# Patient Record
Sex: FEMALE | Race: WHITE
[De-identification: names, ages, dates, MRNs, and addresses within clinical notes are randomized per-mention and may not be internally consistent; named-entity substitution may affect disease eponyms.]

---

## 2017-04-09 ENCOUNTER — HOSPITAL ENCOUNTER (INPATIENT)
Dept: HOSPITAL 23 - P1E | Age: 67
LOS: 8 days | Discharge: HOME | DRG: 885 | End: 2017-04-17
Admitting: PSYCHIATRY & NEUROLOGY
Payer: COMMERCIAL

## 2017-04-09 DIAGNOSIS — Z88.0: ICD-10-CM

## 2017-04-09 DIAGNOSIS — Z85.820: ICD-10-CM

## 2017-04-09 DIAGNOSIS — E78.5: ICD-10-CM

## 2017-04-09 DIAGNOSIS — F39: ICD-10-CM

## 2017-04-09 DIAGNOSIS — I48.91: ICD-10-CM

## 2017-04-09 DIAGNOSIS — Z88.1: ICD-10-CM

## 2017-04-09 DIAGNOSIS — I10: ICD-10-CM

## 2017-04-09 DIAGNOSIS — E66.01: ICD-10-CM

## 2017-04-09 DIAGNOSIS — E11.9: ICD-10-CM

## 2017-04-09 DIAGNOSIS — Z91.040: ICD-10-CM

## 2017-04-09 DIAGNOSIS — F33.2: Primary | ICD-10-CM

## 2017-04-10 LAB
BARBITURATES UR QL SCN: 0.5 MG/DL (ref 0.2–2)
BARBITURATES UR QL SCN: 3.6 G/DL (ref 3.5–5)
BARBITURATES: (no result)
BASOPHIL#: 0 X10E3 (ref 0–0.3)
BASOPHIL%: 0.8 % (ref 0–2.5)
BENZODIAZ UR QL SCN: 14 U/L (ref 10–40)
BENZODIAZ UR QL SCN: 15 U/L (ref 10–42)
BENZODIAZEPINES: (no result)
BLOOD UREA NITROGEN: 22 MG/DL (ref 9–23)
BUN/CREATININE RATIO: 24.44
BZE UR QL SCN: 76 U/L (ref 32–92)
CALCIUM SERUM: 8.8 MG/DL (ref 8.4–10.2)
CK MB SERPL-RTO: 13.8 % (ref 11–15.5)
CK MB SERPL-RTO: 33.1 G/DL (ref 30–36)
COCAINE: (no result)
CREATININE SERUM: 0.9 MG/DL (ref 0.6–1.4)
DIFF IND: NO
DX ICD CODE: (no result)
DX ICD CODE: (no result)
EOSINOPHIL#: 0 X10E3 (ref 0–0.7)
EOSINOPHIL%: 0.7 % (ref 0–7)
FREE THYROXIN (T4): 0.94 NG/DL (ref 0.58–1.64)
GLOM FILT RATE ESTIMATED: 67.1 ML/MIN (ref 60–?)
GLUCOSE FASTING: 99 MG/DL (ref 70–110)
HEMATOCRIT: 37.1 % (ref 35–45)
HEMOGLOBIN: 12.3 GM/DL (ref 12–16)
KETONES UR QL: 104 MMOL/L (ref 100–111)
KETONES UR QL: 27 MMOL/L (ref 22–31)
LYMPHOCYTE#: 1.7 X10E3 (ref 1–3.5)
LYMPHOCYTE%: 26.6 % (ref 17–45)
MEAN CELL VOLUME: 92.4 FL (ref 83–96)
MEAN CORPUSCULAR HEMOGLOBIN: 30.6 PG (ref 28–34)
MEAN PLATELET VOLUME: 9 FL (ref 6.5–11.5)
MONOCYTE#: 0.6 X10E3 (ref 0–1)
MONOCYTE%: 10.1 % (ref 3–12)
NEUTROPHIL#: 3.9 X10E3 (ref 1.5–7.1)
NEUTROPHIL%: 61.8 % (ref 40–75)
OPIATES: (no result)
PLATELET COUNT: 193 X10E3 (ref 140–420)
POTASSIUM: 4.1 MMOL/L (ref 3.5–5.1)
PROTEIN TOTAL SERUM: 6.1 G/DL (ref 6–8.3)
RED BLOOD COUNT: 4.02 X10E (ref 3.9–5.3)
SODIUM: 139 MMOL/L (ref 135–145)
TEGRETOL (CARBAMAZEPINE): 4.6 UG/ML (ref 4–12)
THYROID STIMULATING HORMONE: 3.13 UIU/ML (ref 0.34–5.6)
TRICYCLIC ANTIDEPRESSANTS: (no result)
U METHADONE: (no result)
URINE AMORPHOUS SEDIMENT: (no result)
URINE APPEARANCE: (no result)
URINE BILIRUBIN: (no result)
URINE BLOOD: (no result)
URINE COLOR: YELLOW
URINE CRYSTALS: (no result) /[HPF]
URINE GLUCOSE: (no result) MG/DL
URINE KETONE: (no result)
URINE LEUKOCYTE ESTERASE: (no result)
URINE NITRATE: (no result)
URINE PH: 5 (ref 5–8)
URINE PROTEIN: (no result)
URINE SOURCE: (no result)
URINE SPECIFIC GRAVITY: 1.02 (ref 1–1.03)
URINE SQUAMOUS EPITHELIAL CELL: (no result) /[HPF]
URINE UROBILINOGEN: (no result) MG/DL
WHITE BLOOD COUNT: 6.3 X10E3 (ref 4–10.5)

## 2019-05-09 ENCOUNTER — OFFICE VISIT (OUTPATIENT)
Dept: GYNECOLOGIC ONCOLOGY | Facility: CLINIC | Age: 69
End: 2019-05-09

## 2019-05-09 ENCOUNTER — PREP FOR SURGERY (OUTPATIENT)
Dept: GYNECOLOGIC ONCOLOGY | Facility: CLINIC | Age: 69
End: 2019-05-09

## 2019-05-09 VITALS
HEART RATE: 63 BPM | WEIGHT: 240.8 LBS | TEMPERATURE: 98.2 F | HEIGHT: 65 IN | RESPIRATION RATE: 16 BRPM | SYSTOLIC BLOOD PRESSURE: 133 MMHG | DIASTOLIC BLOOD PRESSURE: 65 MMHG | OXYGEN SATURATION: 96 % | BODY MASS INDEX: 40.12 KG/M2

## 2019-05-09 DIAGNOSIS — C54.1 ENDOMETRIAL CANCER (HCC): Primary | ICD-10-CM

## 2019-05-09 PROCEDURE — 99205 OFFICE O/P NEW HI 60 MIN: CPT | Performed by: OBSTETRICS & GYNECOLOGY

## 2019-05-09 RX ORDER — CITALOPRAM 20 MG/1
20 TABLET ORAL NIGHTLY
COMMUNITY

## 2019-05-09 RX ORDER — ASPIRIN 81 MG/1
81 TABLET ORAL DAILY
COMMUNITY

## 2019-05-09 RX ORDER — SENNOSIDES 8.6 MG
650 CAPSULE ORAL 2 TIMES DAILY PRN
Status: ON HOLD | COMMUNITY
End: 2019-05-17

## 2019-05-09 RX ORDER — CARBAMAZEPINE 200 MG/1
400 TABLET, EXTENDED RELEASE ORAL NIGHTLY
COMMUNITY

## 2019-05-09 RX ORDER — ARIPIPRAZOLE 5 MG/1
5 TABLET ORAL NIGHTLY
COMMUNITY

## 2019-05-09 RX ORDER — CETIRIZINE HYDROCHLORIDE 10 MG/1
10 TABLET ORAL NIGHTLY
COMMUNITY
End: 2020-03-05

## 2019-05-09 RX ORDER — MONTELUKAST SODIUM 10 MG/1
10 TABLET ORAL NIGHTLY
COMMUNITY

## 2019-05-09 RX ORDER — LISINOPRIL 5 MG/1
5 TABLET ORAL DAILY
COMMUNITY

## 2019-05-09 RX ORDER — ATORVASTATIN CALCIUM 10 MG/1
20 TABLET, FILM COATED ORAL DAILY
COMMUNITY

## 2019-05-09 NOTE — PROGRESS NOTES
Bhavna Talbot  [id number]  1950      Reason for visit: Grade 1 endometrial adenocarcinoma, obesity, bipolar disorder, history of auditory hallucinations with stressors    Consultation:  Patient is being seen at the request of Dr. Gross     History of present illness:  The patient is a 68 y.o. female who presents today for treatment and evaluation of the above issues.    Patient initially presented with one episode of postmenopausal bleeding about 1 month ago.  Per records, an ultrasound was performed that showed a thickened endometrial stripe.  Images are not available for review. An endometrial biopsy was attempted in the office but was not successful.  Hysteroscopy with D&C was performed on 4/19/2019.  Pathology returned as endometrioid type well differentiated adenocarcinoma.  She denies any bowel complaints.  She endorses urinary urgency.  She is feeling very fatigued.  She is referred here for further evaluation.    She reports a history of bipolar disorder. She is managed by SHAHRZAD Hawthorne at [facility].  Her next appointment is on May 16th.  She is taking carbamazepine and aripiprazole.  She reports that she has had exacerbations twice in her life, associated with stressful events.  The last was about 2 years ago when her  was sick.  She reports auditory hallucinations at that time and thinks that it was related to an episode of diverticulitis she experienced due to poor eating habits.  She reports that her  passed away in December 2018.  She is accompanied today by a close friend.    She reports that she has been working on weight loss by eating with a nutritionist and increasing her walking.    For new patients, Counts include 234 beds at the [facility] intake form from 5/9/19 was reviewed and confirmed today.    OBGYN History:  She is a G0.  She does not use HRT. She does not have a history of abnormal pap smears.    Oncologic History:   No history exists.         Past Medical History:   Diagnosis Date    • Asthma    • Atrial fibrillation and flutter (CMS/[facility])    • Bipolar 1 disorder (CMS/[facility]) 1988   • Diabetes type 2, controlled (CMS/[facility])    • Diverticulitis 2016   • Hearing loss 1970   • High blood pressure    • Sleep apnea 1992       Past Surgical History:   Procedure Laterality Date   • INNER EAR SURGERY  1970   • REPLACEMENT TOTAL KNEE  2018   • SINUS SURGERY  1970   • UVULOPLASTY  2000       MEDICATIONS: The current medication list was reviewed with the patient and updated in the EMR this date per the Medical Assistant. Medication dosages and frequencies were confirmed to be accurate.      Allergies:  is allergic to eggs or egg-derived products; keflex [cephalexin]; latex; and penicillins.    Social History:   Social History     Socioeconomic History   • Marital status:      Spouse name: Not on file   • Number of children: Not on file   • Years of education: Not on file   • Highest education level: Not on file   Tobacco Use   • Smoking status: Never Smoker   Substance and Sexual Activity   • Alcohol use: Yes     Frequency: Monthly or less     Drinks per session: 1 or 2     Binge frequency: Never   • Drug use: No   • Sexual activity: No     Partners: Male       Family History:    Family History   Problem Relation Age of Onset   • Alzheimer's disease Mother    • Esophageal cancer Father    • Diabetes Maternal Grandmother    • Colon cancer Paternal Grandfather    • Diabetes Paternal Uncle    • Heart attack Brother        Health Maintenance:    Health Maintenance   Topic Date Due   • ANNUAL PHYSICAL  05/12/1953   • TDAP/TD VACCINES (1 - Tdap) 05/12/1970   • ZOSTER VACCINE (1 of 2) 05/12/2001   • PNEUMOCOCCAL VACCINES (65+ LOW/MEDIUM RISK) (1 of 2 - PCV13) 05/12/2016   • HEPATITIS C SCREENING  05/09/2019   • COLONOSCOPY  05/09/2019   • INFLUENZA VACCINE  08/01/2019       Review of Systems   Constitutional: Positive for chills and fatigue. Negative for activity change, appetite change and diaphoresis.  "  HENT: Positive for rhinorrhea, sinus pain and tinnitus. Negative for ear pain and sore throat.    Eyes: Positive for visual disturbance (corrective lenses). Negative for photophobia.   Respiratory: Negative for chest tightness, shortness of breath and wheezing.    Cardiovascular: Negative for chest pain and palpitations.   Gastrointestinal: Negative for abdominal distention, abdominal pain, constipation, diarrhea, nausea and vomiting.   Endocrine: Negative for cold intolerance and heat intolerance.   Genitourinary: Positive for frequency and urgency. Negative for dysuria, hematuria and pelvic pain.   Musculoskeletal: Positive for myalgias. Negative for joint swelling.   Skin: Negative for color change and rash.   Allergic/Immunologic: Negative for environmental allergies and immunocompromised state.   Neurological: Negative for dizziness, numbness and headaches.   Hematological: Negative for adenopathy. Does not bruise/bleed easily.   Psychiatric/Behavioral: Positive for dysphoric mood (depression). Negative for agitation and hallucinations.       Physical Exam    Vitals:    05/09/19 1501   BP: 133/65   Pulse: 63   Resp: 16   Temp: 98.2 °F (36.8 °C)   TempSrc: Oral   SpO2: 96%   Weight: 109 kg (240 lb 12.8 oz)   Height: 163.8 cm (64.5\")   PainSc:   5     Body mass index is 40.69 kg/m².    Wt Readings from Last 3 Encounters:   05/09/19 109 kg (240 lb 12.8 oz)       GENERAL: Alert, well-appearing female appearing her stated age who is in no apparent distress.  Patient is obese by BMI criteria.  HEENT: Sclera anicteric. Head normocephalic, atraumatic. Mucus membranes moist.   NECK: Trachea midline, supple, without masses.  No thyromegaly.   BREASTS: Deferred  CARDIOVASCULAR: Normal rate, regular rhythm, no murmurs, rubs, or gallops.  2+ peripheral edema bilaterally in lower extremities.  RESPIRATORY: Clear to auscultation bilaterally, normal respiratory effort  BACK:  No CVA tenderness, no vertebral tenderness on " palpation  GASTROINTESTINAL:  Abdomen is soft, non-tender, non-distended, no rebound or guarding, no masses, or hernias. No HSM.  Abdomen is obese  SKIN:  Warm, dry, well-perfused.  All visible areas intact.  No rashes, lesions, ulcers.  PSYCHIATRIC: AO x3, with appropriate affect, normal thought processes.  NEUROLOGIC: No focal deficits.  Moves extremities well.  MUSCULOSKELETAL: Normal gait and station.   EXTREMITIES:   No cyanosis, clubbing, symmetric.  LYMPHATICS:  No cervical or inguinal adenopathy noted.     PELVIC exam:    GYNECOLOGIC:  External genitalia are free from lesion.  Vaginal introitus was stenotic.  On speculum examination, the cervix was free from lesion. On bimanual examination no mass was appreciated.  Uterus was normal in size and shape. There is no cervical motion or uterine tenderness. No cervical mass was palpated. Parametria were smooth. Rectovaginal exam was deferred.     ECOG PS 0    PROCEDURES:  None     Diagnostic Data:    4/19/19 D+C: endometrioid type well differentiated adenocarcinoma    No Images in the past 120 days found..    No results found for: WBC, HGB, HCT, MCV, PLT, NEUTROABS, GLUCOSE, BUN, CREATININE, EGFRIFNONA, EGFRIFAFRI, NA, K, CL, CO2, MG, PHOS, CALCIUM, ALBUMIN, AST, ALT, BILITOT  No results found for:         Assessment/Plan   This is a 68 y.o. woman with newly diagnosed grade 1 endometrial cancer.  Discussed this diagnosis and the required treatment in detail, including the relationship with obesity.  Counseled the patient that weight loss would be part of her cancer care.  She is planning to see a dietitian and is trying to increase her exercise.    Patient was consented for robotic assisted total laparoscopic hysterectomy, bilateral salpingo-oophorectomy, possible lymph node dissection.      Risks and benefits of surgery were discussed.  This included, but was not limited to, infection and bleeding like when the skin is cut; damage to surrounding structures;  and incisional complications.  Risk of DVT was addressed for major surgeries.  Standard of care efforts to minimize these risks were reviewed.  Typical hospital stay and recovery were discussed as well as post-procedure precautions.  Surgical implications of chronic illnesses on recovery and surgical outcome were reviewed.     Pain medication regimen for postoperative care was discussed.  Typical regimen and avoidance of narcotics was discussed.  Patient was educated that other factors, such as existing narcotic use, can impact postoperative pain management.      Risks and benefits of lymph node dissection were further discussed.  This included lymphocyst, hematoma, lymphedema, vascular injury, and nerve injury.    Patient verbalized understanding of the plan including the risks and benefits.  Appropriate perioperative testing including laboratory evaluation, EKG as clinically indicated, chest x-ray as clinically indicated, and preadmission evaluation were all ordered as a part of this patient's care.    -Bipolar disorder  She was counseled that surgery acts as a stressor that may precipitate an exacerbation of her bipolar disorder.  She is planning to see her provider next week and she is advised to let him know about her upcoming surgery.  She will continue her current medications as prescribed.    Pain assessment was performed today as a part of patient’s care.  For patients with pain related to surgery, gynecologic malignancy or cancer treatment, the plan is as noted in the assessment/plan.  For patients with pain not related to these issues, they are to seek any further needed care from a more appropriate provider, such as PCP.    No orders of the defined types were placed in this encounter.      FOLLOW UP: Surgery    Patient was seen and examined with Dr. Lucas,  resident, who performed portions of the examination and documentation for this patient's care under my direct supervision.  I agree with the  above documentation and plan.    Shelly Rivera MD  05/09/19  5:57 PM

## 2019-05-09 NOTE — H&P (VIEW-ONLY)
Bhavna Talbot  [id number]  1950      Reason for visit: Grade 1 endometrial adenocarcinoma, obesity, bipolar disorder, history of auditory hallucinations with stressors    Consultation:  Patient is being seen at the request of Dr. Gross     History of present illness:  The patient is a 68 y.o. female who presents today for treatment and evaluation of the above issues.    Patient initially presented with one episode of postmenopausal bleeding about 1 month ago.  Per records, an ultrasound was performed that showed a thickened endometrial stripe.  Images are not available for review. An endometrial biopsy was attempted in the office but was not successful.  Hysteroscopy with D&C was performed on 4/19/2019.  Pathology returned as endometrioid type well differentiated adenocarcinoma.  She denies any bowel complaints.  She endorses urinary urgency.  She is feeling very fatigued.  She is referred here for further evaluation.    She reports a history of bipolar disorder. She is managed by SHAHRZAD Hawthorne at [facility].  Her next appointment is on May 16th.  She is taking carbamazepine and aripiprazole.  She reports that she has had exacerbations twice in her life, associated with stressful events.  The last was about 2 years ago when her  was sick.  She reports auditory hallucinations at that time and thinks that it was related to an episode of diverticulitis she experienced due to poor eating habits.  She reports that her  passed away in December 2018.  She is accompanied today by a close friend.    She reports that she has been working on weight loss by eating with a nutritionist and increasing her walking.    For new patients, [facility] intake form from 5/9/19 was reviewed and confirmed today.    OBGYN History:  She is a G0.  She does not use HRT. She does not have a history of abnormal pap smears.    Oncologic History:   No history exists.         Past Medical History:   Diagnosis Date    • Asthma    • Atrial fibrillation and flutter (CMS/[facility])    • Bipolar 1 disorder (CMS/[facility]) 1988   • Diabetes type 2, controlled (CMS/[facility])    • Diverticulitis 2016   • Hearing loss 1970   • High blood pressure    • Sleep apnea 1992       Past Surgical History:   Procedure Laterality Date   • INNER EAR SURGERY  1970   • REPLACEMENT TOTAL KNEE  2018   • SINUS SURGERY  1970   • UVULOPLASTY  2000       MEDICATIONS: The current medication list was reviewed with the patient and updated in the EMR this date per the Medical Assistant. Medication dosages and frequencies were confirmed to be accurate.      Allergies:  is allergic to eggs or egg-derived products; keflex [cephalexin]; latex; and penicillins.    Social History:   Social History     Socioeconomic History   • Marital status:      Spouse name: Not on file   • Number of children: Not on file   • Years of education: Not on file   • Highest education level: Not on file   Tobacco Use   • Smoking status: Never Smoker   Substance and Sexual Activity   • Alcohol use: Yes     Frequency: Monthly or less     Drinks per session: 1 or 2     Binge frequency: Never   • Drug use: No   • Sexual activity: No     Partners: Male       Family History:    Family History   Problem Relation Age of Onset   • Alzheimer's disease Mother    • Esophageal cancer Father    • Diabetes Maternal Grandmother    • Colon cancer Paternal Grandfather    • Diabetes Paternal Uncle    • Heart attack Brother        Health Maintenance:    Health Maintenance   Topic Date Due   • ANNUAL PHYSICAL  05/12/1953   • TDAP/TD VACCINES (1 - Tdap) 05/12/1970   • ZOSTER VACCINE (1 of 2) 05/12/2001   • PNEUMOCOCCAL VACCINES (65+ LOW/MEDIUM RISK) (1 of 2 - PCV13) 05/12/2016   • HEPATITIS C SCREENING  05/09/2019   • COLONOSCOPY  05/09/2019   • INFLUENZA VACCINE  08/01/2019       Review of Systems   Constitutional: Positive for chills and fatigue. Negative for activity change, appetite change and diaphoresis.  "  HENT: Positive for rhinorrhea, sinus pain and tinnitus. Negative for ear pain and sore throat.    Eyes: Positive for visual disturbance (corrective lenses). Negative for photophobia.   Respiratory: Negative for chest tightness, shortness of breath and wheezing.    Cardiovascular: Negative for chest pain and palpitations.   Gastrointestinal: Negative for abdominal distention, abdominal pain, constipation, diarrhea, nausea and vomiting.   Endocrine: Negative for cold intolerance and heat intolerance.   Genitourinary: Positive for frequency and urgency. Negative for dysuria, hematuria and pelvic pain.   Musculoskeletal: Positive for myalgias. Negative for joint swelling.   Skin: Negative for color change and rash.   Allergic/Immunologic: Negative for environmental allergies and immunocompromised state.   Neurological: Negative for dizziness, numbness and headaches.   Hematological: Negative for adenopathy. Does not bruise/bleed easily.   Psychiatric/Behavioral: Positive for dysphoric mood (depression). Negative for agitation and hallucinations.       Physical Exam    Vitals:    05/09/19 1501   BP: 133/65   Pulse: 63   Resp: 16   Temp: 98.2 °F (36.8 °C)   TempSrc: Oral   SpO2: 96%   Weight: 109 kg (240 lb 12.8 oz)   Height: 163.8 cm (64.5\")   PainSc:   5     Body mass index is 40.69 kg/m².    Wt Readings from Last 3 Encounters:   05/09/19 109 kg (240 lb 12.8 oz)       GENERAL: Alert, well-appearing female appearing her stated age who is in no apparent distress.  Patient is obese by BMI criteria.  HEENT: Sclera anicteric. Head normocephalic, atraumatic. Mucus membranes moist.   NECK: Trachea midline, supple, without masses.  No thyromegaly.   BREASTS: Deferred  CARDIOVASCULAR: Normal rate, regular rhythm, no murmurs, rubs, or gallops.  2+ peripheral edema bilaterally in lower extremities.  RESPIRATORY: Clear to auscultation bilaterally, normal respiratory effort  BACK:  No CVA tenderness, no vertebral tenderness on " palpation  GASTROINTESTINAL:  Abdomen is soft, non-tender, non-distended, no rebound or guarding, no masses, or hernias. No HSM.  Abdomen is obese  SKIN:  Warm, dry, well-perfused.  All visible areas intact.  No rashes, lesions, ulcers.  PSYCHIATRIC: AO x3, with appropriate affect, normal thought processes.  NEUROLOGIC: No focal deficits.  Moves extremities well.  MUSCULOSKELETAL: Normal gait and station.   EXTREMITIES:   No cyanosis, clubbing, symmetric.  LYMPHATICS:  No cervical or inguinal adenopathy noted.     PELVIC exam:    GYNECOLOGIC:  External genitalia are free from lesion.  Vaginal introitus was stenotic.  On speculum examination, the cervix was free from lesion. On bimanual examination no mass was appreciated.  Uterus was normal in size and shape. There is no cervical motion or uterine tenderness. No cervical mass was palpated. Parametria were smooth. Rectovaginal exam was deferred.     ECOG PS 0    PROCEDURES:  None     Diagnostic Data:    4/19/19 D+C: endometrioid type well differentiated adenocarcinoma    No Images in the past 120 days found..    No results found for: WBC, HGB, HCT, MCV, PLT, NEUTROABS, GLUCOSE, BUN, CREATININE, EGFRIFNONA, EGFRIFAFRI, NA, K, CL, CO2, MG, PHOS, CALCIUM, ALBUMIN, AST, ALT, BILITOT  No results found for:         Assessment/Plan   This is a 68 y.o. woman with newly diagnosed grade 1 endometrial cancer.  Discussed this diagnosis and the required treatment in detail, including the relationship with obesity.  Counseled the patient that weight loss would be part of her cancer care.  She is planning to see a dietitian and is trying to increase her exercise.    Patient was consented for robotic assisted total laparoscopic hysterectomy, bilateral salpingo-oophorectomy, possible lymph node dissection.      Risks and benefits of surgery were discussed.  This included, but was not limited to, infection and bleeding like when the skin is cut; damage to surrounding structures;  and incisional complications.  Risk of DVT was addressed for major surgeries.  Standard of care efforts to minimize these risks were reviewed.  Typical hospital stay and recovery were discussed as well as post-procedure precautions.  Surgical implications of chronic illnesses on recovery and surgical outcome were reviewed.     Pain medication regimen for postoperative care was discussed.  Typical regimen and avoidance of narcotics was discussed.  Patient was educated that other factors, such as existing narcotic use, can impact postoperative pain management.      Risks and benefits of lymph node dissection were further discussed.  This included lymphocyst, hematoma, lymphedema, vascular injury, and nerve injury.    Patient verbalized understanding of the plan including the risks and benefits.  Appropriate perioperative testing including laboratory evaluation, EKG as clinically indicated, chest x-ray as clinically indicated, and preadmission evaluation were all ordered as a part of this patient's care.    -Bipolar disorder  She was counseled that surgery acts as a stressor that may precipitate an exacerbation of her bipolar disorder.  She is planning to see her provider next week and she is advised to let him know about her upcoming surgery.  She will continue her current medications as prescribed.    Pain assessment was performed today as a part of patient’s care.  For patients with pain related to surgery, gynecologic malignancy or cancer treatment, the plan is as noted in the assessment/plan.  For patients with pain not related to these issues, they are to seek any further needed care from a more appropriate provider, such as PCP.    No orders of the defined types were placed in this encounter.      FOLLOW UP: Surgery    Patient was seen and examined with Dr. Lucas,  resident, who performed portions of the examination and documentation for this patient's care under my direct supervision.  I agree with the  above documentation and plan.    Shelly Rivera MD  05/09/19  5:57 PM

## 2019-05-10 ENCOUNTER — PATIENT EDUCATION (SURGERY INSTRUCTIONS) (OUTPATIENT)
Dept: GYNECOLOGIC ONCOLOGY | Facility: CLINIC | Age: 69
End: 2019-05-10

## 2019-05-10 RX ORDER — CELECOXIB 100 MG/1
200 CAPSULE ORAL ONCE
Status: CANCELLED | OUTPATIENT
Start: 2019-05-10

## 2019-05-10 RX ORDER — PREGABALIN 25 MG/1
150 CAPSULE ORAL ONCE
Status: CANCELLED | OUTPATIENT
Start: 2019-05-10

## 2019-05-10 RX ORDER — SODIUM CHLORIDE, SODIUM LACTATE, POTASSIUM CHLORIDE, CALCIUM CHLORIDE 600; 310; 30; 20 MG/100ML; MG/100ML; MG/100ML; MG/100ML
100 INJECTION, SOLUTION INTRAVENOUS CONTINUOUS
Status: CANCELLED | OUTPATIENT
Start: 2019-05-10

## 2019-05-10 RX ORDER — ACETAMINOPHEN 325 MG/1
650 TABLET ORAL ONCE
Status: CANCELLED | OUTPATIENT
Start: 2019-05-10

## 2019-05-10 NOTE — PATIENT INSTRUCTIONS
Gynecologic Oncology  Inpatient Pre-op Patient Education  *See checked boxes for your instructions*    Patient Name:  Bhavna Talbot  [id number]  1950    Surgeon:  Dr. Rivera    Appointment  [x]  1. Your surgery has been scheduled on 5-17-19. You will need to be at the second floor surgery registration of the [facility] hospital on that day at 8:00 am. Enter the campus grounds through entrance #2, park in [facility], walk up the hill into the hospital and follow that nguyen until you see elevators on right, use that elevator to go to the 2nd floor, when the door opens, you will see an arrow to direct you to registration.      [x] 2.  You have a pre-admission testing (PAT) appointment for labs and possibly chest xray and EKG, on 5-16-19 at 3:00 pm, you do not need to be fasting for this appointment. You will need to be at hospital registration on the first floor, 10 minutes before that time.    [x] 3.  The hospital registration department is located in the long hallway between the [facility] and [facility] buildings.     The Day(s) Before Surgery  [x] 1. On 5-16-19, the day prior to surgery, eat lightly.  No solid food after midnight on 5-16-19, including NO MILK, CREAM, OR ORANGE JUICE.  You may have sips of clear fluids up until two-three hours prior to your arrival to the hospital on the morning of surgery.     [] 2.  You may need to use a stool softener, the day prior to surgery to help with existing constipation and to clean out your bowels.  You can purchase Miralax over-the-counter at the pharmacy and follow the directions on the back.  (Do not do this step unless the box is checked).   [x] 3.  Do not take vitamins or full dose aspirin one week before surgery.  If you normally take a blood thinning medication such as Warfarin, Eliquis, or Xarelto, we will give you specific instructions regarding these medications and we may need to talk with your other doctors.   [x] 4.  On the morning of your surgery, you may likely take  your routine prescription medications with a sip of water as reviewed with you by your surgeon.  Bring your home medications with you to the hospital as we may need to reference these.  In particular be sure to bring any inhalers.     Post-surgery Instructions  [x] 1.  The length of stay for your type of surgery is typically one hospital night, however it is also possible that you could be discharged home in the evening on the same day depending on the nature of your surgery.  All rooms are private, so family member may stay with you.     [x] 2.  Do not take your own home prescription medication while you are in the hospital unless otherwise instructed.  These will be provided to you.         Comments:  Please remove all fingernail polish.

## 2019-05-13 ENCOUNTER — TELEPHONE (OUTPATIENT)
Dept: GYNECOLOGIC ONCOLOGY | Facility: CLINIC | Age: 69
End: 2019-05-13

## 2019-05-13 NOTE — TELEPHONE ENCOUNTER
----- Message from Bonilla Gamez sent at 5/9/2019  2:45 PM EDT -----  Regarding: DR CRENSHAW/[facility]   Contact: [phone]  PT DROPPED OFF [facility] PPW  PT FILLED OUT FORM   PT WOULD LIKE IT FAXED [phone]  PT PAID FEE

## 2019-05-16 ENCOUNTER — HOSPITAL ENCOUNTER (OUTPATIENT)
Dept: GENERAL RADIOLOGY | Facility: HOSPITAL | Age: 69
Discharge: HOME | End: 2019-05-16
Admitting: OBSTETRICS & GYNECOLOGY

## 2019-05-16 ENCOUNTER — APPOINTMENT (OUTPATIENT)
Dept: PREADMISSION TESTING | Facility: HOSPITAL | Age: 69
End: 2019-05-16

## 2019-05-16 VITALS — HEIGHT: 64 IN | BODY MASS INDEX: 40.61 KG/M2 | WEIGHT: 237.88 LBS

## 2019-05-16 DIAGNOSIS — C54.1 ENDOMETRIAL CANCER (HCC): ICD-10-CM

## 2019-05-16 LAB
ABO GROUP BLD: NORMAL
ALBUMIN SERPL-MCNC: 3.8 G/DL (ref 3.5–5.2)
ALBUMIN/GLOB SERPL: 1.2 G/DL
ALP SERPL-CCNC: 125 U/L (ref 39–117)
ALT SERPL W P-5'-P-CCNC: 16 U/L (ref 1–33)
ANION GAP SERPL CALCULATED.3IONS-SCNC: 11 MMOL/L
ANTIBODY TO LOW FREQUENCY ANTIGEN: NORMAL
AST SERPL-CCNC: 18 U/L (ref 1–32)
BASOPHILS # BLD AUTO: 0.01 10*3/MM3 (ref 0–0.2)
BASOPHILS NFR BLD AUTO: 0.2 % (ref 0–1.5)
BILIRUB SERPL-MCNC: 0.2 MG/DL (ref 0.2–1.2)
BLD GP AB SCN SERPL QL: POSITIVE
BUN BLD-MCNC: 19 MG/DL (ref 8–23)
BUN/CREAT SERPL: 22.1 (ref 7–25)
CALCIUM SPEC-SCNC: 8.7 MG/DL (ref 8.6–10.5)
CHLORIDE SERPL-SCNC: 100 MMOL/L (ref 98–107)
CO2 SERPL-SCNC: 28 MMOL/L (ref 22–29)
CREAT BLD-MCNC: 0.86 MG/DL (ref 0.57–1)
DEPRECATED RDW RBC AUTO: 48.6 FL (ref 37–54)
EOSINOPHIL # BLD AUTO: 0.15 10*3/MM3 (ref 0–0.4)
EOSINOPHIL NFR BLD AUTO: 3.4 % (ref 0.3–6.2)
ERYTHROCYTE [DISTWIDTH] IN BLOOD BY AUTOMATED COUNT: 13.9 % (ref 12.3–15.4)
GFR SERPL CREATININE-BSD FRML MDRD: 66 ML/MIN/1.73
GLOBULIN UR ELPH-MCNC: 3.1 GM/DL
GLUCOSE BLD-MCNC: 94 MG/DL (ref 65–99)
HBA1C MFR BLD: 5.4 % (ref 4.8–5.6)
HCT VFR BLD AUTO: 38.3 % (ref 34–46.6)
HGB BLD-MCNC: 11.9 G/DL (ref 12–15.9)
IMM GRANULOCYTES # BLD AUTO: 0.01 10*3/MM3 (ref 0–0.05)
IMM GRANULOCYTES NFR BLD AUTO: 0.2 % (ref 0–0.5)
LYMPHOCYTES # BLD AUTO: 1.02 10*3/MM3 (ref 0.7–3.1)
LYMPHOCYTES NFR BLD AUTO: 23.3 % (ref 19.6–45.3)
MCH RBC QN AUTO: 29.7 PG (ref 26.6–33)
MCHC RBC AUTO-ENTMCNC: 31.1 G/DL (ref 31.5–35.7)
MCV RBC AUTO: 95.5 FL (ref 79–97)
MONOCYTES # BLD AUTO: 0.44 10*3/MM3 (ref 0.1–0.9)
MONOCYTES NFR BLD AUTO: 10.1 % (ref 5–12)
NEUTROPHILS # BLD AUTO: 2.75 10*3/MM3 (ref 1.7–7)
NEUTROPHILS NFR BLD AUTO: 63 % (ref 42.7–76)
NRBC BLD AUTO-RTO: 0 /100 WBC (ref 0–0.2)
PLATELET # BLD AUTO: 212 10*3/MM3 (ref 140–450)
PMV BLD AUTO: 10.4 FL (ref 6–12)
POTASSIUM BLD-SCNC: 4.7 MMOL/L (ref 3.5–5.2)
PROT SERPL-MCNC: 6.9 G/DL (ref 6–8.5)
RBC # BLD AUTO: 4.01 10*6/MM3 (ref 3.77–5.28)
RH BLD: NEGATIVE
SODIUM BLD-SCNC: 139 MMOL/L (ref 136–145)
T&S EXPIRATION DATE: NORMAL
WBC NRBC COR # BLD: 4.37 10*3/MM3 (ref 3.4–10.8)

## 2019-05-16 PROCEDURE — 85025 COMPLETE CBC W/AUTO DIFF WBC: CPT | Performed by: OBSTETRICS & GYNECOLOGY

## 2019-05-16 PROCEDURE — 36415 COLL VENOUS BLD VENIPUNCTURE: CPT

## 2019-05-16 PROCEDURE — 93005 ELECTROCARDIOGRAM TRACING: CPT

## 2019-05-16 PROCEDURE — 80053 COMPREHEN METABOLIC PANEL: CPT | Performed by: OBSTETRICS & GYNECOLOGY

## 2019-05-16 PROCEDURE — 86900 BLOOD TYPING SEROLOGIC ABO: CPT | Performed by: OBSTETRICS & GYNECOLOGY

## 2019-05-16 PROCEDURE — 93010 ELECTROCARDIOGRAM REPORT: CPT | Performed by: INTERNAL MEDICINE

## 2019-05-16 PROCEDURE — 86870 RBC ANTIBODY IDENTIFICATION: CPT | Performed by: OBSTETRICS & GYNECOLOGY

## 2019-05-16 PROCEDURE — 71046 X-RAY EXAM CHEST 2 VIEWS: CPT

## 2019-05-16 PROCEDURE — 86850 RBC ANTIBODY SCREEN: CPT | Performed by: OBSTETRICS & GYNECOLOGY

## 2019-05-16 PROCEDURE — 86901 BLOOD TYPING SEROLOGIC RH(D): CPT | Performed by: OBSTETRICS & GYNECOLOGY

## 2019-05-16 PROCEDURE — 83036 HEMOGLOBIN GLYCOSYLATED A1C: CPT | Performed by: ANESTHESIOLOGY

## 2019-05-16 NOTE — PAT
Blood bank called, patient has + antibodies. Dr. Rivera notified, stated she does not want any units prepared. Blood Blank called and notified.

## 2019-05-16 NOTE — PAT
Patient to apply Chlorhexadine wipes  to surgical area (as instructed) the night before procedure and the AM of procedure. Wipes provided.    Per Anesthesia Request, patient instructed not to take their ACE/ARB medications on the AM of surgery.    Blood bank bracelet applied to patient during Pre Admission Testing visit.  Patient instructed not to remove from arm until after procedure and they are discharged from the hospital.  Explained to patient that they may shower and get the bracelet wet, but not to immerse under water for longer periods (bathing, swimming, hand dishwashing, etc).  Patient verbalized understanding.    EkG faxed to anesthesia, cleared by Dr. Chadwick for surgery.     Latex allergy called to surgery scheduling

## 2019-05-17 ENCOUNTER — ANESTHESIA EVENT (OUTPATIENT)
Dept: PERIOP | Facility: HOSPITAL | Age: 69
End: 2019-05-17

## 2019-05-17 ENCOUNTER — HOSPITAL ENCOUNTER (OUTPATIENT)
Facility: HOSPITAL | Age: 69
Discharge: HOME | End: 2019-05-18
Attending: OBSTETRICS & GYNECOLOGY | Admitting: OBSTETRICS & GYNECOLOGY

## 2019-05-17 ENCOUNTER — ANESTHESIA (OUTPATIENT)
Dept: PERIOP | Facility: HOSPITAL | Age: 69
End: 2019-05-17

## 2019-05-17 DIAGNOSIS — C54.1 ENDOMETRIAL CANCER (HCC): ICD-10-CM

## 2019-05-17 LAB
ABO GROUP BLD: NORMAL
GLUCOSE BLDC GLUCOMTR-MCNC: 113 MG/DL (ref 70–130)
GLUCOSE BLDC GLUCOMTR-MCNC: 142 MG/DL (ref 70–130)
GLUCOSE BLDC GLUCOMTR-MCNC: 156 MG/DL (ref 70–130)
RH BLD: NEGATIVE

## 2019-05-17 PROCEDURE — 25010000002 ONDANSETRON PER 1 MG: Performed by: NURSE ANESTHETIST, CERTIFIED REGISTERED

## 2019-05-17 PROCEDURE — 25010000002 DEXAMETHASONE PER 1 MG: Performed by: NURSE ANESTHETIST, CERTIFIED REGISTERED

## 2019-05-17 PROCEDURE — 88331 PATH CONSLTJ SURG 1 BLK 1SPC: CPT | Performed by: PATHOLOGY

## 2019-05-17 PROCEDURE — 86901 BLOOD TYPING SEROLOGIC RH(D): CPT

## 2019-05-17 PROCEDURE — 58571 TLH W/T/O 250 G OR LESS: CPT | Performed by: OBSTETRICS & GYNECOLOGY

## 2019-05-17 PROCEDURE — 82962 GLUCOSE BLOOD TEST: CPT

## 2019-05-17 PROCEDURE — 86900 BLOOD TYPING SEROLOGIC ABO: CPT

## 2019-05-17 PROCEDURE — 88381 MICRODISSECTION MANUAL: CPT

## 2019-05-17 PROCEDURE — 25010000002 FENTANYL CITRATE (PF) 100 MCG/2ML SOLUTION: Performed by: NURSE ANESTHETIST, CERTIFIED REGISTERED

## 2019-05-17 PROCEDURE — 63710000001 INSULIN REGULAR HUMAN PER 5 UNITS: Performed by: OBSTETRICS & GYNECOLOGY

## 2019-05-17 PROCEDURE — 25010000002 PROMETHAZINE PER 50 MG: Performed by: NURSE ANESTHETIST, CERTIFIED REGISTERED

## 2019-05-17 PROCEDURE — 25010000002 HYDROMORPHONE PER 4 MG: Performed by: NURSE ANESTHETIST, CERTIFIED REGISTERED

## 2019-05-17 PROCEDURE — 88309 TISSUE EXAM BY PATHOLOGIST: CPT | Performed by: OBSTETRICS & GYNECOLOGY

## 2019-05-17 PROCEDURE — 81288 MLH1 GENE: CPT

## 2019-05-17 PROCEDURE — 25010000002 GENTAMICIN PER 80 MG: Performed by: OBSTETRICS & GYNECOLOGY

## 2019-05-17 PROCEDURE — 25010000002 MIDAZOLAM PER 1 MG: Performed by: NURSE ANESTHETIST, CERTIFIED REGISTERED

## 2019-05-17 RX ORDER — DEXAMETHASONE SODIUM PHOSPHATE 10 MG/ML
INJECTION INTRAMUSCULAR; INTRAVENOUS AS NEEDED
Status: DISCONTINUED | OUTPATIENT
Start: 2019-05-17 | End: 2019-05-17 | Stop reason: SURG

## 2019-05-17 RX ORDER — ACETAMINOPHEN 325 MG/1
650 TABLET ORAL EVERY 6 HOURS SCHEDULED
Status: DISCONTINUED | OUTPATIENT
Start: 2019-05-17 | End: 2019-05-18 | Stop reason: HOSPADM

## 2019-05-17 RX ORDER — SODIUM CHLORIDE 0.9 % (FLUSH) 0.9 %
3-10 SYRINGE (ML) INJECTION AS NEEDED
Status: DISCONTINUED | OUTPATIENT
Start: 2019-05-17 | End: 2019-05-17 | Stop reason: HOSPADM

## 2019-05-17 RX ORDER — SODIUM CHLORIDE, SODIUM LACTATE, POTASSIUM CHLORIDE, CALCIUM CHLORIDE 600; 310; 30; 20 MG/100ML; MG/100ML; MG/100ML; MG/100ML
100 INJECTION, SOLUTION INTRAVENOUS CONTINUOUS
Status: DISCONTINUED | OUTPATIENT
Start: 2019-05-17 | End: 2019-05-17

## 2019-05-17 RX ORDER — NALOXONE HCL 0.4 MG/ML
0.1 VIAL (ML) INJECTION
Status: DISCONTINUED | OUTPATIENT
Start: 2019-05-17 | End: 2019-05-18 | Stop reason: HOSPADM

## 2019-05-17 RX ORDER — SODIUM CHLORIDE 9 MG/ML
INJECTION, SOLUTION INTRAVENOUS AS NEEDED
Status: DISCONTINUED | OUTPATIENT
Start: 2019-05-17 | End: 2019-05-17 | Stop reason: HOSPADM

## 2019-05-17 RX ORDER — SENNOSIDES 8.6 MG
650 CAPSULE ORAL EVERY 8 HOURS PRN
Qty: 30 TABLET | Refills: 0 | Status: SHIPPED | OUTPATIENT
Start: 2019-05-17

## 2019-05-17 RX ORDER — ARIPIPRAZOLE 10 MG/1
5 TABLET ORAL NIGHTLY
Status: DISCONTINUED | OUTPATIENT
Start: 2019-05-17 | End: 2019-05-18 | Stop reason: HOSPADM

## 2019-05-17 RX ORDER — ONDANSETRON 2 MG/ML
INJECTION INTRAMUSCULAR; INTRAVENOUS AS NEEDED
Status: DISCONTINUED | OUTPATIENT
Start: 2019-05-17 | End: 2019-05-17 | Stop reason: SURG

## 2019-05-17 RX ORDER — DOCUSATE SODIUM 250 MG
250 CAPSULE ORAL 2 TIMES DAILY PRN
Qty: 60 CAPSULE | Refills: 0 | Status: SHIPPED | OUTPATIENT
Start: 2019-05-17

## 2019-05-17 RX ORDER — PROMETHAZINE HYDROCHLORIDE 25 MG/1
25 SUPPOSITORY RECTAL ONCE AS NEEDED
Status: DISCONTINUED | OUTPATIENT
Start: 2019-05-17 | End: 2019-05-17 | Stop reason: HOSPADM

## 2019-05-17 RX ORDER — MONTELUKAST SODIUM 10 MG/1
10 TABLET ORAL NIGHTLY
Status: DISCONTINUED | OUTPATIENT
Start: 2019-05-17 | End: 2019-05-18 | Stop reason: HOSPADM

## 2019-05-17 RX ORDER — PROMETHAZINE HYDROCHLORIDE 25 MG/ML
12.5 INJECTION, SOLUTION INTRAMUSCULAR; INTRAVENOUS EVERY 6 HOURS PRN
Status: DISCONTINUED | OUTPATIENT
Start: 2019-05-17 | End: 2019-05-18 | Stop reason: HOSPADM

## 2019-05-17 RX ORDER — FENTANYL CITRATE 50 UG/ML
INJECTION, SOLUTION INTRAMUSCULAR; INTRAVENOUS AS NEEDED
Status: DISCONTINUED | OUTPATIENT
Start: 2019-05-17 | End: 2019-05-17 | Stop reason: SURG

## 2019-05-17 RX ORDER — ONDANSETRON 2 MG/ML
4 INJECTION INTRAMUSCULAR; INTRAVENOUS EVERY 6 HOURS PRN
Status: DISCONTINUED | OUTPATIENT
Start: 2019-05-17 | End: 2019-05-18 | Stop reason: HOSPADM

## 2019-05-17 RX ORDER — PREGABALIN 150 MG/1
150 CAPSULE ORAL ONCE
Status: COMPLETED | OUTPATIENT
Start: 2019-05-17 | End: 2019-05-17

## 2019-05-17 RX ORDER — CARBAMAZEPINE 400 MG/1
400 TABLET, EXTENDED RELEASE ORAL NIGHTLY
Status: DISCONTINUED | OUTPATIENT
Start: 2019-05-17 | End: 2019-05-17

## 2019-05-17 RX ORDER — PROMETHAZINE HYDROCHLORIDE 25 MG/ML
12.5 INJECTION, SOLUTION INTRAMUSCULAR; INTRAVENOUS ONCE AS NEEDED
Status: DISCONTINUED | OUTPATIENT
Start: 2019-05-17 | End: 2019-05-17 | Stop reason: HOSPADM

## 2019-05-17 RX ORDER — GLYCOPYRROLATE 0.2 MG/ML
INJECTION INTRAMUSCULAR; INTRAVENOUS AS NEEDED
Status: DISCONTINUED | OUTPATIENT
Start: 2019-05-17 | End: 2019-05-17 | Stop reason: SURG

## 2019-05-17 RX ORDER — ONDANSETRON 4 MG/1
4 TABLET, FILM COATED ORAL EVERY 6 HOURS PRN
Status: DISCONTINUED | OUTPATIENT
Start: 2019-05-17 | End: 2019-05-18 | Stop reason: HOSPADM

## 2019-05-17 RX ORDER — CARBAMAZEPINE 100 MG/1
400 TABLET, EXTENDED RELEASE ORAL EVERY 12 HOURS SCHEDULED
Status: DISCONTINUED | OUTPATIENT
Start: 2019-05-17 | End: 2019-05-18 | Stop reason: HOSPADM

## 2019-05-17 RX ORDER — MAGNESIUM HYDROXIDE 1200 MG/15ML
LIQUID ORAL AS NEEDED
Status: DISCONTINUED | OUTPATIENT
Start: 2019-05-17 | End: 2019-05-17 | Stop reason: HOSPADM

## 2019-05-17 RX ORDER — ACETAMINOPHEN 325 MG/1
650 TABLET ORAL ONCE
Status: COMPLETED | OUTPATIENT
Start: 2019-05-17 | End: 2019-05-17

## 2019-05-17 RX ORDER — ATORVASTATIN CALCIUM 10 MG/1
10 TABLET, FILM COATED ORAL DAILY
Status: DISCONTINUED | OUTPATIENT
Start: 2019-05-17 | End: 2019-05-18 | Stop reason: HOSPADM

## 2019-05-17 RX ORDER — IBUPROFEN 600 MG/1
600 TABLET ORAL EVERY 6 HOURS PRN
Qty: 30 TABLET | Refills: 1 | Status: SHIPPED | OUTPATIENT
Start: 2019-05-17

## 2019-05-17 RX ORDER — FAMOTIDINE 10 MG/ML
20 INJECTION, SOLUTION INTRAVENOUS ONCE
Status: CANCELLED | OUTPATIENT
Start: 2019-05-17 | End: 2019-05-17

## 2019-05-17 RX ORDER — PROMETHAZINE HYDROCHLORIDE 12.5 MG/1
12.5 SUPPOSITORY RECTAL EVERY 6 HOURS PRN
Status: DISCONTINUED | OUTPATIENT
Start: 2019-05-17 | End: 2019-05-18 | Stop reason: HOSPADM

## 2019-05-17 RX ORDER — BUPIVACAINE HYDROCHLORIDE AND EPINEPHRINE 5; 5 MG/ML; UG/ML
INJECTION, SOLUTION PERINEURAL AS NEEDED
Status: DISCONTINUED | OUTPATIENT
Start: 2019-05-17 | End: 2019-05-17 | Stop reason: HOSPADM

## 2019-05-17 RX ORDER — DOCUSATE SODIUM 50 MG/5 ML
250 LIQUID (ML) ORAL 2 TIMES DAILY
Status: DISCONTINUED | OUTPATIENT
Start: 2019-05-17 | End: 2019-05-18 | Stop reason: HOSPADM

## 2019-05-17 RX ORDER — SODIUM CHLORIDE, SODIUM LACTATE, POTASSIUM CHLORIDE, CALCIUM CHLORIDE 600; 310; 30; 20 MG/100ML; MG/100ML; MG/100ML; MG/100ML
9 INJECTION, SOLUTION INTRAVENOUS CONTINUOUS
Status: DISCONTINUED | OUTPATIENT
Start: 2019-05-17 | End: 2019-05-17

## 2019-05-17 RX ORDER — HYDROMORPHONE HYDROCHLORIDE 1 MG/ML
0.5 INJECTION, SOLUTION INTRAMUSCULAR; INTRAVENOUS; SUBCUTANEOUS
Status: DISCONTINUED | OUTPATIENT
Start: 2019-05-17 | End: 2019-05-18 | Stop reason: HOSPADM

## 2019-05-17 RX ORDER — CLINDAMYCIN PHOSPHATE 900 MG/50ML
900 INJECTION INTRAVENOUS ONCE
Status: COMPLETED | OUTPATIENT
Start: 2019-05-17 | End: 2019-05-17

## 2019-05-17 RX ORDER — CITALOPRAM 20 MG/1
20 TABLET ORAL NIGHTLY
Status: DISCONTINUED | OUTPATIENT
Start: 2019-05-17 | End: 2019-05-18 | Stop reason: HOSPADM

## 2019-05-17 RX ORDER — FAMOTIDINE 20 MG/1
20 TABLET, FILM COATED ORAL ONCE
Status: COMPLETED | OUTPATIENT
Start: 2019-05-17 | End: 2019-05-17

## 2019-05-17 RX ORDER — SODIUM CHLORIDE 0.9 % (FLUSH) 0.9 %
3 SYRINGE (ML) INJECTION EVERY 12 HOURS SCHEDULED
Status: DISCONTINUED | OUTPATIENT
Start: 2019-05-17 | End: 2019-05-17 | Stop reason: HOSPADM

## 2019-05-17 RX ORDER — OXYCODONE HYDROCHLORIDE 5 MG/1
10 TABLET ORAL EVERY 4 HOURS PRN
Status: DISCONTINUED | OUTPATIENT
Start: 2019-05-17 | End: 2019-05-18 | Stop reason: HOSPADM

## 2019-05-17 RX ORDER — FENTANYL CITRATE 50 UG/ML
50 INJECTION, SOLUTION INTRAMUSCULAR; INTRAVENOUS
Status: DISCONTINUED | OUTPATIENT
Start: 2019-05-17 | End: 2019-05-17 | Stop reason: HOSPADM

## 2019-05-17 RX ORDER — NICOTINE POLACRILEX 4 MG
15 LOZENGE BUCCAL
Status: DISCONTINUED | OUTPATIENT
Start: 2019-05-17 | End: 2019-05-18 | Stop reason: HOSPADM

## 2019-05-17 RX ORDER — LIDOCAINE HYDROCHLORIDE 10 MG/ML
INJECTION, SOLUTION EPIDURAL; INFILTRATION; INTRACAUDAL; PERINEURAL AS NEEDED
Status: DISCONTINUED | OUTPATIENT
Start: 2019-05-17 | End: 2019-05-17 | Stop reason: SURG

## 2019-05-17 RX ORDER — ONDANSETRON 2 MG/ML
4 INJECTION INTRAMUSCULAR; INTRAVENOUS ONCE AS NEEDED
Status: DISCONTINUED | OUTPATIENT
Start: 2019-05-17 | End: 2019-05-17 | Stop reason: HOSPADM

## 2019-05-17 RX ORDER — OXYCODONE HYDROCHLORIDE 5 MG/1
5 TABLET ORAL EVERY 4 HOURS PRN
Status: DISCONTINUED | OUTPATIENT
Start: 2019-05-17 | End: 2019-05-18 | Stop reason: HOSPADM

## 2019-05-17 RX ORDER — OXYCODONE HYDROCHLORIDE 5 MG/1
5 TABLET ORAL EVERY 4 HOURS PRN
Qty: 10 TABLET | Refills: 0 | Status: SHIPPED | OUTPATIENT
Start: 2019-05-17 | End: 2019-09-05

## 2019-05-17 RX ORDER — CETIRIZINE HYDROCHLORIDE 10 MG/1
10 TABLET ORAL NIGHTLY
Status: DISCONTINUED | OUTPATIENT
Start: 2019-05-17 | End: 2019-05-18 | Stop reason: HOSPADM

## 2019-05-17 RX ORDER — ASPIRIN 81 MG/1
81 TABLET ORAL DAILY
Status: DISCONTINUED | OUTPATIENT
Start: 2019-05-17 | End: 2019-05-18 | Stop reason: HOSPADM

## 2019-05-17 RX ORDER — PROMETHAZINE HYDROCHLORIDE 25 MG/ML
INJECTION, SOLUTION INTRAMUSCULAR; INTRAVENOUS AS NEEDED
Status: DISCONTINUED | OUTPATIENT
Start: 2019-05-17 | End: 2019-05-17 | Stop reason: SURG

## 2019-05-17 RX ORDER — PROMETHAZINE HYDROCHLORIDE 25 MG/1
12.5 TABLET ORAL EVERY 6 HOURS PRN
Status: DISCONTINUED | OUTPATIENT
Start: 2019-05-17 | End: 2019-05-18 | Stop reason: HOSPADM

## 2019-05-17 RX ORDER — HYDROMORPHONE HYDROCHLORIDE 1 MG/ML
0.5 INJECTION, SOLUTION INTRAMUSCULAR; INTRAVENOUS; SUBCUTANEOUS
Status: DISCONTINUED | OUTPATIENT
Start: 2019-05-17 | End: 2019-05-17 | Stop reason: HOSPADM

## 2019-05-17 RX ORDER — NALOXONE HCL 0.4 MG/ML
0.4 VIAL (ML) INJECTION
Status: DISCONTINUED | OUTPATIENT
Start: 2019-05-17 | End: 2019-05-18 | Stop reason: HOSPADM

## 2019-05-17 RX ORDER — LIDOCAINE HYDROCHLORIDE 10 MG/ML
0.5 INJECTION, SOLUTION EPIDURAL; INFILTRATION; INTRACAUDAL; PERINEURAL ONCE AS NEEDED
Status: COMPLETED | OUTPATIENT
Start: 2019-05-17 | End: 2019-05-17

## 2019-05-17 RX ORDER — LISINOPRIL 5 MG/1
5 TABLET ORAL DAILY
Status: DISCONTINUED | OUTPATIENT
Start: 2019-05-17 | End: 2019-05-18 | Stop reason: HOSPADM

## 2019-05-17 RX ORDER — IBUPROFEN 600 MG/1
600 TABLET ORAL EVERY 6 HOURS PRN
Status: DISCONTINUED | OUTPATIENT
Start: 2019-05-17 | End: 2019-05-18 | Stop reason: HOSPADM

## 2019-05-17 RX ORDER — SODIUM CHLORIDE, SODIUM LACTATE, POTASSIUM CHLORIDE, CALCIUM CHLORIDE 600; 310; 30; 20 MG/100ML; MG/100ML; MG/100ML; MG/100ML
75 INJECTION, SOLUTION INTRAVENOUS CONTINUOUS
Status: DISCONTINUED | OUTPATIENT
Start: 2019-05-17 | End: 2019-05-18 | Stop reason: HOSPADM

## 2019-05-17 RX ORDER — CELECOXIB 200 MG/1
200 CAPSULE ORAL ONCE
Status: COMPLETED | OUTPATIENT
Start: 2019-05-17 | End: 2019-05-17

## 2019-05-17 RX ORDER — DEXTROSE MONOHYDRATE 25 G/50ML
25 INJECTION, SOLUTION INTRAVENOUS
Status: DISCONTINUED | OUTPATIENT
Start: 2019-05-17 | End: 2019-05-18 | Stop reason: HOSPADM

## 2019-05-17 RX ORDER — ONDANSETRON 8 MG/1
8 TABLET, ORALLY DISINTEGRATING ORAL EVERY 8 HOURS PRN
Qty: 30 TABLET | Refills: 2 | Status: SHIPPED | OUTPATIENT
Start: 2019-05-17

## 2019-05-17 RX ORDER — MIDAZOLAM HYDROCHLORIDE 1 MG/ML
INJECTION INTRAMUSCULAR; INTRAVENOUS AS NEEDED
Status: DISCONTINUED | OUTPATIENT
Start: 2019-05-17 | End: 2019-05-17 | Stop reason: SURG

## 2019-05-17 RX ORDER — FAMOTIDINE 20 MG/1
20 TABLET, FILM COATED ORAL 2 TIMES DAILY
Status: DISCONTINUED | OUTPATIENT
Start: 2019-05-17 | End: 2019-05-18 | Stop reason: HOSPADM

## 2019-05-17 RX ORDER — PROMETHAZINE HYDROCHLORIDE 25 MG/1
25 TABLET ORAL ONCE AS NEEDED
Status: DISCONTINUED | OUTPATIENT
Start: 2019-05-17 | End: 2019-05-17 | Stop reason: HOSPADM

## 2019-05-17 RX ORDER — ROCURONIUM BROMIDE 10 MG/ML
INJECTION, SOLUTION INTRAVENOUS AS NEEDED
Status: DISCONTINUED | OUTPATIENT
Start: 2019-05-17 | End: 2019-05-17 | Stop reason: SURG

## 2019-05-17 RX ADMIN — GENTAMICIN SULFATE: 40 INJECTION, SOLUTION INTRAMUSCULAR; INTRAVENOUS at 11:05

## 2019-05-17 RX ADMIN — CETIRIZINE HYDROCHLORIDE 10 MG: 10 TABLET, FILM COATED ORAL at 23:53

## 2019-05-17 RX ADMIN — FENTANYL CITRATE 50 MCG: 50 INJECTION, SOLUTION INTRAMUSCULAR; INTRAVENOUS at 11:08

## 2019-05-17 RX ADMIN — FAMOTIDINE 20 MG: 20 TABLET ORAL at 23:53

## 2019-05-17 RX ADMIN — SODIUM CHLORIDE, POTASSIUM CHLORIDE, SODIUM LACTATE AND CALCIUM CHLORIDE 100 ML/HR: 600; 310; 30; 20 INJECTION, SOLUTION INTRAVENOUS at 08:40

## 2019-05-17 RX ADMIN — GLYCOPYRROLATE 0.2 MG: 0.2 INJECTION, SOLUTION INTRAMUSCULAR; INTRAVENOUS at 11:30

## 2019-05-17 RX ADMIN — ACETAMINOPHEN 650 MG: 325 TABLET, FILM COATED ORAL at 17:23

## 2019-05-17 RX ADMIN — ONDANSETRON 4 MG: 2 INJECTION INTRAMUSCULAR; INTRAVENOUS at 12:50

## 2019-05-17 RX ADMIN — CELECOXIB 200 MG: 200 CAPSULE ORAL at 08:54

## 2019-05-17 RX ADMIN — ACETAMINOPHEN 650 MG: 325 TABLET ORAL at 08:41

## 2019-05-17 RX ADMIN — DEXAMETHASONE SODIUM PHOSPHATE 8 MG: 10 INJECTION INTRAMUSCULAR; INTRAVENOUS at 11:33

## 2019-05-17 RX ADMIN — GENTAMICIN SULFATE 170 MG: 40 INJECTION, SOLUTION INTRAMUSCULAR; INTRAVENOUS at 11:15

## 2019-05-17 RX ADMIN — PROMETHAZINE HYDROCHLORIDE 7.5 MG: 25 INJECTION INTRAMUSCULAR; INTRAVENOUS at 11:08

## 2019-05-17 RX ADMIN — LIDOCAINE HYDROCHLORIDE 50 MG: 10 INJECTION, SOLUTION EPIDURAL; INFILTRATION; INTRACAUDAL; PERINEURAL at 11:08

## 2019-05-17 RX ADMIN — ARIPIPRAZOLE 5 MG: 10 TABLET ORAL at 23:53

## 2019-05-17 RX ADMIN — ASPIRIN 81 MG: 81 TABLET, COATED ORAL at 17:23

## 2019-05-17 RX ADMIN — MONTELUKAST SODIUM 10 MG: 10 TABLET, COATED ORAL at 23:56

## 2019-05-17 RX ADMIN — ATORVASTATIN CALCIUM 10 MG: 10 TABLET, FILM COATED ORAL at 17:23

## 2019-05-17 RX ADMIN — METOPROLOL TARTRATE 25 MG: 25 TABLET ORAL at 23:56

## 2019-05-17 RX ADMIN — DOCUSATE SODIUM 250 MG: 50 LIQUID ORAL at 23:54

## 2019-05-17 RX ADMIN — CLINDAMYCIN PHOSPHATE 900 MG: 18 INJECTION, SOLUTION INTRAVENOUS at 11:00

## 2019-05-17 RX ADMIN — CITALOPRAM HYDROBROMIDE 20 MG: 20 TABLET ORAL at 23:53

## 2019-05-17 RX ADMIN — PREGABALIN 150 MG: 150 CAPSULE ORAL at 08:41

## 2019-05-17 RX ADMIN — HYDROMORPHONE HYDROCHLORIDE 0.5 MG: 1 INJECTION, SOLUTION INTRAMUSCULAR; INTRAVENOUS; SUBCUTANEOUS at 14:05

## 2019-05-17 RX ADMIN — FAMOTIDINE 20 MG: 20 TABLET ORAL at 08:41

## 2019-05-17 RX ADMIN — SODIUM CHLORIDE, POTASSIUM CHLORIDE, SODIUM LACTATE AND CALCIUM CHLORIDE 75 ML/HR: 600; 310; 30; 20 INJECTION, SOLUTION INTRAVENOUS at 16:27

## 2019-05-17 RX ADMIN — LIDOCAINE HYDROCHLORIDE 0.3 ML: 10 INJECTION, SOLUTION EPIDURAL; INFILTRATION; INTRACAUDAL; PERINEURAL at 08:39

## 2019-05-17 RX ADMIN — CARBAMAZEPINE 400 MG: 100 TABLET, EXTENDED RELEASE ORAL at 23:53

## 2019-05-17 RX ADMIN — SODIUM CHLORIDE, POTASSIUM CHLORIDE, SODIUM LACTATE AND CALCIUM CHLORIDE: 600; 310; 30; 20 INJECTION, SOLUTION INTRAVENOUS at 12:50

## 2019-05-17 RX ADMIN — HYDROMORPHONE HYDROCHLORIDE 0.5 MG: 1 INJECTION, SOLUTION INTRAMUSCULAR; INTRAVENOUS; SUBCUTANEOUS at 13:59

## 2019-05-17 RX ADMIN — EPHEDRINE SULFATE 10 MG: 50 INJECTION INTRAMUSCULAR; INTRAVENOUS; SUBCUTANEOUS at 11:13

## 2019-05-17 RX ADMIN — ACETAMINOPHEN 650 MG: 325 TABLET, FILM COATED ORAL at 23:53

## 2019-05-17 RX ADMIN — ROCURONIUM BROMIDE 50 MG: 10 INJECTION INTRAVENOUS at 11:10

## 2019-05-17 RX ADMIN — INSULIN HUMAN 2 UNITS: 100 INJECTION, SOLUTION PARENTERAL at 18:18

## 2019-05-17 RX ADMIN — MIDAZOLAM HYDROCHLORIDE 2 MG: 1 INJECTION, SOLUTION INTRAMUSCULAR; INTRAVENOUS at 11:00

## 2019-05-17 NOTE — ANESTHESIA POSTPROCEDURE EVALUATION
Patient: Bhavna Talbot    Procedure Summary     Date:  05/17/19 Room / Location:   [facility] 18 /  [facility]    Anesthesia Start:  1100 Anesthesia Stop:      Procedure:  TOTAL LAPAROSCOPIC HYSTERECTOMY BILATERAL SALPINGOOPHORECTOMY WITH DAVINCI ROBOT (Bilateral Abdomen) Diagnosis:       Endometrial cancer (CMS/HCC)      (Endometrial cancer (CMS/HCC) [C54.1])    Surgeon:  Shelly Rivera MD Provider:  Ramiro Mccloud MD    Anesthesia Type:  general ASA Status:  3          Anesthesia Type: general  Last vitals  BP   114/63 (05/17/19 0827)   Temp   98.3 °F (36.8 °C) (05/17/19 1325)   Pulse   88 (05/17/19 1325)   Resp   18 (05/17/19 0827)     SpO2   95 % (05/17/19 1325)     Post Anesthesia Care and Evaluation    Patient location during evaluation: PACU  Patient participation: waiting for patient participation  Level of consciousness: awake and alert and responsive to physical stimuli  Pain score: 0  Pain management: adequate  Airway patency: patent  Anesthetic complications: No anesthetic complications  PONV Status: none  Cardiovascular status: hemodynamically stable and acceptable  Respiratory status: nonlabored ventilation, acceptable and oral airway  Hydration status: acceptable

## 2019-05-17 NOTE — ANESTHESIA PROCEDURE NOTES
Airway  Urgency: elective    Airway not difficult    General Information and Staff    Patient location during procedure: OR  CRNA: Shanique Dickey CRNA    Indications and Patient Condition  Indications for airway management: airway protection    Preoxygenated: yes  MILS not maintained throughout  Mask difficulty assessment: 2 - vent by mask + OA or adjuvant +/- NMBA    Final Airway Details  Final airway type: endotracheal airway      Successful airway: ETT  Cuffed: yes   Successful intubation technique: direct laryngoscopy  Facilitating devices/methods: anterior pressure/BURP and Bougie  Endotracheal tube insertion site: oral  Blade: Art  Blade size: 3  ETT size (mm): 7.0  Cormack-Lehane Classification: grade IIb - view of arytenoids or posterior of glottis only  Placement verified by: chest auscultation and capnometry   Measured from: lips  ETT to lips (cm): 20  Number of attempts at approach: 1    Additional Comments  Negative epigastric sounds, Breath sound equal bilaterally with symmetric chest rise and fall

## 2019-05-17 NOTE — OP NOTE
Subjective     Date of Service:  05/17/19  Time of Service:  1:04 PM    Surgical Staff: Surgeon(s) and Role:     * Shelly Rivera MD - Primary     * Anastasia Lucas MD - Resident - Assisting   Additional Staff:    Pre-operative diagnosis(es): Pre-Op Diagnosis Codes:     * Endometrial cancer (CMS/[facility]) [C54.1]  Clinical Stage I (TINOSNXMO) grade 1   Obesity with BMI equal to 40.68     Post-operative diagnosis(es): Post-Op Diagnosis Codes:     Same     Procedure(s): Procedure(s):  TOTAL LAPAROSCOPIC HYSTERECTOMY BILATERAL SALPINGOOPHORECTOMY WITH DAVINCI ROBOT     Antibiotics: cefoxitin (Mefoxin) ordered on call to OR     Anesthesia: Type: General  ASA:  III     Objective      Operative findings:  At the time of examination under anesthesia, stenotic vagina was noted.  At laparoscopy, there is a benign-appearing right ovarian cyst.  On frozen section, there is a minimally invasive adenocarcinoma of the uterus.     Specimens removed: ID Type Source Tests Collected by Time   A :  Tissue Uterus with Cervix, Bilateral Tubes and Ovaries TISSUE PATHOLOGY EXAM Shelly Rivera MD 5/17/2019 1221      Fluid Intake:    Output:     I/O this shift:  In: 1000 [I.V.:1000]  Out: -   UOP = 25 mL EBL = 50 mL   Blood products used: No   Drains: Urethral Catheter 16 Fr. (Active)      Implant Information: Nothing was implanted during the procedure   Complications: No immediate   Intraoperative consult(s):    Condition: stable   Disposition: to PACU and then possible discharge home         Indications:    Patient is a pleasant 69-year-old woman with the above preoperative diagnosis.  Risks and benefits of surgery were discussed.  Consent was signed and on chart.    Procedure:   After obtaining informed consent, the patient was  taken to the operating room and underwent general endotracheal anesthesia after  patient and site verification. The feet were placed in Jesu stirrups. The arms were tucked at the sides. Steep  Trendelenburg  positioning was tested and found to be adequate. The abdomen, perineum, and  vagina were prepped and draped in the usual sterile fashion. Douglas catheter was  anchored. Retractors were used to visualize the cervix. Cervix was sounded and the appropriate uterine manipulator was called for.  Uterine manipulator was secured with out difficulty.  Attention was turned to the abdomen. Prior  to each incision, skin was injected with 0.5% Marcaine with epinephrine. A 12  mm trocar was inserted at the umbilical midline position in the open  technique without difficulty. Laparoscope was introduced to confirm  positioning. Steep Trendelenburg was called for. The abdomen was insufflated to  a pressure of 15 mmHg with CO2 gas.  2 left-sided 8 mm and 2 right-sided 8 mm trochars were all placed under direct visualization.  The above findings were noted.  Job App Plus robot was docked to the patient and surgeon retired to the operating console.    The peritoneum overlying the pelvic sidewalls was divided with monopolar scissors.  Infundibulopelvic ligaments were isolated from surrounding structures and  pedicles were created using bipolar cautery and scissors. Likewise, the round  ligaments were divided. Anterior and posterior leaves of the broad ligament  were divided with monopolar scissors. A bladder  flap was developed.  Uterine vessels were isolated. Pedicles were created at the level of the uterine vessels using bipolar cautery and scissors. Cardinal ligament and uterosacral ligament complexes were divided in a similar fashion. Monopolar scissors were used to perform a circumferential colpotomy and the specimen was handed off intact via the vagina for frozen section with the above noted results.     The vaginal cuff was closed with 0 Vicryl suture in a running locking fashion x2 layers. Good hemostasis was noted. Additional hemostasis  was achieved at the pelvis as needed using bipolar cautery. Job App Plus robot  was  undocked from the patient and the surgeon returned to the bedside.  Trochars were removed under direct visualization.  CO2 gas was allowed to escape from the abdominal cavity.  0 Vicryl suture was used to reapproximate the fascia at the umbilical midline incision. At that point, no fascial defects could be palpated.  The skin was closed with 3-0Monocryl in subcuticular stitch and glue was placed at the skin. The vagina was inspected.  Occluder and all instruments had been removed from the vagina.  Bladder was backfilled with 120 mL of sterile saline and Douglas catheter was removed.  Good hemostasis was noted at the vagina although there was a small posterior abrasion which was not clinically significant.  The patient was taken to the recovery room in good condition. There were no immediate complications. All counts were correct.          Shelly Rivera MD  05/17/19  1:04 PM

## 2019-05-17 NOTE — INTERVAL H&P NOTE
"  H&P reviewed. The patient was examined and there are no changes to the H&P.       CV:  History of murmur/afib.  Last office visit with cards at [facility] 1/2019, last TTE 2018.  Put in request to obtain records, awaiting results    /63 (BP Location: Right arm, Patient Position: Lying)   Pulse 88   Temp 97.3 °F (36.3 °C) (Temporal)   Resp 18   Ht 162.6 cm (64\")   Wt 108 kg (237 lb)   SpO2 96%   BMI 40.68 kg/m²       I saw and evaluated the patient. I agree with the findings and the plan of care as documented in the note.    Shelly Rivera MD  05/17/19  10:31 AM    "

## 2019-05-17 NOTE — PLAN OF CARE
Problem: Patient Care Overview  Goal: Plan of Care Review  Outcome: Ongoing (interventions implemented as appropriate)   05/17/19 6680   Coping/Psychosocial   Plan of Care Reviewed With patient   Plan of Care Review   Progress no change   OTHER   Outcome Summary Patient has had no c/o of pain and has been resting since getting on floor. VSS. Schedules medication given. Will continue to monitor.

## 2019-05-17 NOTE — ANESTHESIA PREPROCEDURE EVALUATION
Anesthesia Evaluation     Patient summary reviewed and Nursing notes reviewed                Airway   Mallampati: III  TM distance: >3 FB  Neck ROM: full  Possible difficult intubation  Dental - normal exam     Pulmonary - normal exam   (+) asthma, sleep apnea (non-compliant),   Cardiovascular - normal exam    (+) hypertension, dysrhythmias Paroxysmal Atrial Fib, murmur, hyperlipidemia,       Neuro/Psych  (+) psychiatric history Bipolar,     GI/Hepatic/Renal/Endo    (+) morbid obesity,  diabetes mellitus,     Musculoskeletal     Abdominal  - normal exam    Bowel sounds: normal.   Substance History - negative use     OB/GYN negative ob/gyn ROS         Other   (+) arthritis   history of cancer                  Anesthesia Plan    ASA 3     general   (Glidescope available)  intravenous induction   Anesthetic plan, all risks, benefits, and alternatives have been provided, discussed and informed consent has been obtained with: patient.    Plan discussed with CRNA.

## 2019-05-17 NOTE — PROGRESS NOTES
I was notified by nursing staff the patient is fairly somnolent and is not urinated since her procedure.  Plan was made for observation overnight.  Orders were placed.    Shelly Rivera MD  05/17/19  4:04 PM

## 2019-05-18 VITALS
TEMPERATURE: 98.4 F | SYSTOLIC BLOOD PRESSURE: 94 MMHG | WEIGHT: 237 LBS | HEIGHT: 64 IN | BODY MASS INDEX: 40.46 KG/M2 | OXYGEN SATURATION: 94 % | HEART RATE: 61 BPM | RESPIRATION RATE: 17 BRPM | DIASTOLIC BLOOD PRESSURE: 55 MMHG

## 2019-05-18 LAB
GLUCOSE BLDC GLUCOMTR-MCNC: 106 MG/DL (ref 70–130)
GLUCOSE BLDC GLUCOMTR-MCNC: 76 MG/DL (ref 70–130)
GLUCOSE BLDC GLUCOMTR-MCNC: 99 MG/DL (ref 70–130)

## 2019-05-18 PROCEDURE — 25010000002 ENOXAPARIN PER 10 MG: Performed by: OBSTETRICS & GYNECOLOGY

## 2019-05-18 PROCEDURE — 99024 POSTOP FOLLOW-UP VISIT: CPT | Performed by: OBSTETRICS & GYNECOLOGY

## 2019-05-18 PROCEDURE — 82962 GLUCOSE BLOOD TEST: CPT

## 2019-05-18 RX ADMIN — DOCUSATE SODIUM 250 MG: 50 LIQUID ORAL at 08:40

## 2019-05-18 RX ADMIN — FAMOTIDINE 20 MG: 20 TABLET ORAL at 08:41

## 2019-05-18 RX ADMIN — ASPIRIN 81 MG: 81 TABLET, COATED ORAL at 08:41

## 2019-05-18 RX ADMIN — ATORVASTATIN CALCIUM 10 MG: 10 TABLET, FILM COATED ORAL at 08:40

## 2019-05-18 RX ADMIN — ENOXAPARIN SODIUM 40 MG: 40 INJECTION SUBCUTANEOUS at 08:47

## 2019-05-18 RX ADMIN — ACETAMINOPHEN 650 MG: 325 TABLET, FILM COATED ORAL at 05:34

## 2019-05-18 RX ADMIN — SODIUM CHLORIDE, POTASSIUM CHLORIDE, SODIUM LACTATE AND CALCIUM CHLORIDE 75 ML/HR: 600; 310; 30; 20 INJECTION, SOLUTION INTRAVENOUS at 05:34

## 2019-05-18 RX ADMIN — ACETAMINOPHEN 650 MG: 325 TABLET, FILM COATED ORAL at 12:09

## 2019-05-18 NOTE — PROGRESS NOTES
Winifred Talbot  : 1950  MRN: [id number]  CSN: [id number]    Post-operative Day #1  Subjective   Patient seen and examined. Pain controlled. Ambulating to restroom. Tolerating diet. Desires discharge.     Objective     Min/max vitals past 24 hours:   Temp  Min: 97.3 °F (36.3 °C)  Max: 98.6 °F (37 °C)  BP  Min: 84/47  Max: 129/73  Pulse  Min: 50  Max: 88  Resp  Min: 17  Max: 18        I/O last 3 completed shifts:  In: 2981 [I.V.:2981]  Out: 400 [Urine:400]    General: well developed; well nourished  no acute distress   Abdomen: soft, non-tender; no masses  no umbilical or inguinal hernias are present  no hepato-splenomegaly  incision is clean, dry, intact and without drainage   Pelvic: Not performed   Ext: Calves NT     Last 3 values   Results from last 7 days   Lab Units 19  1559   WBC 10*3/mm3 4.37   HEMOGLOBIN g/dL 11.9*   HEMATOCRIT % 38.3   PLATELETS 10*3/mm3 212          Assessment   1. Post-op Day #1 S/P FARHEEN, BSO     Plan   1. Discharge to home  2. D/C questions all answered  3. Follow-up appointment in 3 week(s) with Dr. Rivera.    Katy More DO  2019  10:25 AM

## 2019-05-18 NOTE — DISCHARGE SUMMARY
MARTHA Winifred Talbot  : 1950  MRN: [id number]  CSN: [id number]    Discharge Summary    A formal discharge summary was not needed because this admission was for an observation visit.    Discharge Date: 2019   Discharge Dx:    1. POD#1 s/p TAYLOR ZHONG   Disposition: home   Condition at discharge: stable   Follow up: 3 weeks with Dr. Rivera         This note has been electronically signed.    Katy More DO

## 2019-05-18 NOTE — PLAN OF CARE
Problem: Patient Care Overview  Goal: Plan of Care Review  Outcome: Ongoing (interventions implemented as appropriate)   05/18/19 0619   Coping/Psychosocial   Plan of Care Reviewed With patient   Plan of Care Review   Progress improving   OTHER   Outcome Summary VSS. Encourage po. Sedated most of shift. Tremors when getting up OOB. Unsteady gait. Would recommend PT if discharging today.        Problem: Hysterectomy (Adult)  Goal: Signs and Symptoms of Listed Potential Problems Will be Absent, Minimized or Managed (Hysterectomy)  Outcome: Outcome(s) achieved Date Met: 05/18/19    Goal: Anesthesia/Sedation Recovery  Outcome: Ongoing (interventions implemented as appropriate)

## 2019-05-21 ENCOUNTER — TELEPHONE (OUTPATIENT)
Dept: GYNECOLOGIC ONCOLOGY | Facility: CLINIC | Age: 69
End: 2019-05-21

## 2019-05-21 NOTE — TELEPHONE ENCOUNTER
Shelly Rivera MD  [facility]             pls notify pt of early endometrial cancer- plan for observation      05/21/19: I called and informed pt of the above.  She verbalized understanding.

## 2019-05-24 ENCOUNTER — DOCUMENTATION (OUTPATIENT)
Dept: GYNECOLOGIC ONCOLOGY | Facility: CLINIC | Age: 69
End: 2019-05-24

## 2019-05-24 NOTE — PROGRESS NOTES
[facility] paperwork today.     Forms filled out and placed in Dr. Rivera's box for signature.      Signed and faxed to [phone].

## 2019-06-05 ENCOUNTER — OFFICE VISIT (OUTPATIENT)
Dept: GYNECOLOGIC ONCOLOGY | Facility: CLINIC | Age: 69
End: 2019-06-05

## 2019-06-05 VITALS
SYSTOLIC BLOOD PRESSURE: 117 MMHG | TEMPERATURE: 97.6 F | RESPIRATION RATE: 18 BRPM | BODY MASS INDEX: 39.48 KG/M2 | WEIGHT: 230 LBS | HEART RATE: 62 BPM | OXYGEN SATURATION: 96 % | DIASTOLIC BLOOD PRESSURE: 63 MMHG

## 2019-06-05 DIAGNOSIS — Z98.890 POST-OPERATIVE STATE: Primary | ICD-10-CM

## 2019-06-05 PROCEDURE — 99024 POSTOP FOLLOW-UP VISIT: CPT | Performed by: OBSTETRICS & GYNECOLOGY

## 2019-06-05 RX ORDER — FLUTICASONE PROPIONATE 50 MCG
2 SPRAY, SUSPENSION (ML) NASAL AS NEEDED
COMMUNITY

## 2019-06-05 NOTE — PROGRESS NOTES
Bhavna Talbot  [id number]  1950      Reason for Visit:   Postoperative evaluation    History of Present Illness:  Patient is a very pleasant 69 y.o. woman who presents for a post operative evaluation status post robotic assisted total laparoscopic hysterectomy, bilateral salpingo-oophorectomy performed on May 17, 2019.      Surgery and hospital course were uncomplicated.  Today, patient notes normal bowel and bladder function.  Her pain is well controlled. She has questions about resuming normal activities.   She has some nasal discharge, cold.  No fever.     Past Medical History, Past Surgical History, Social History, Family History have been reviewed and are without significant changes except as mentioned.    Review of Systems   All other systems were reviewed and are negative except as mentioned above.    Medications:  The current medication list was reviewed in the EMR    ALLERGIES:    Allergies   Allergen Reactions   • Eggs Or Egg-Derived Products Other (See Comments)     Patient states that eggs or egg-derived products causes terrible sinus drainage. Patient also stated that they can make her extremely nauseous.    • Keflex [Cephalexin] Swelling   • Latex Rash   • Penicillins Rash           /63   Pulse 62   Temp 97.6 °F (36.4 °C) (Oral)   Resp 18   Wt 104 kg (230 lb)   SpO2 96%   BMI 39.48 kg/m²        Physical Exam  Constitutional:  Patient is a pleasant woman in no acute distress.  Gastrointestinal: Abdomen is soft and appropriately tender.  There is no mass palpated.  There is no rebound or guarding.  Incision is clean, dry and intact.  Extremities:  Bilateral lower extremities are non-tender.  Gynecologic:GYNECOLOGIC:  External genitalia are free from lesion. On speculum examination, atrophic vagina, patient could only tolerate pediatric speculum,  the vaginal cuff was intact and no lesions were appreciated.  On bimanual examination, no fullness was appreciated.  Uterus, cervix and  adnexa were absent.  There was no significant tenderness.  Rectal exam was deferred.      PATHOLOGY:  Final Diagnosis    UTERUS, CERVIX, BILATERAL OVARIES AND FALLOPIAN TUBES, TOTAL HYSTERECTOMY AND BILATERAL SALPINGO-OOPHORECTOMY:  Endometrioid adenocarcinoma, FIGO grade 1, invasive to a maximum depth of 2 mm with a myometrial thickness of 15 mm.  No involvement of cervix, parametrium, fallopian tubes or ovaries.  See tumor synoptic for additional details.        UTERUS ENDOMETRIUM TEMPLATE:  SPECIMEN TYPE/ PROCEDURE:  Total hysterectomy and bilateral salpingo-oophorectomy.  LYMPH NODE SAMPLING:  None performed.  SPECIMEN INTEGRITY:   Intact.  TUMOR SIZE (greatest dimension):  1.5 x 1.0 x 0.4 cm.  HISTOLOGIC TYPE:  Adenocarcinoma.  HISTOLOGIC GRADE:  FIGO grade 1.  MYOMETRIAL INVASION (Present/Not identified):  Present.               DEPTH OF INVASION (mm): 2 mm.               MYOMETRIAL THICKNESS (mm): 15 mm.               PERCENTAGE OF MYOMETRIAL INVASION: 13.3%.  INVOLVEMENT OF CERVICAL STROMA:  Not identified.  EXTENT OF INVOLVEMENT OF OTHER TISSUE/ORGANS:  Not identified.  UTERINE SEROSA INVOLVEMENT: Not identified.  MARGINS:  Negative.  PELVIC LYMPH NODES (SUBMITTED/NONE): None submitted.  LYMPHVASCULAR INVASION:  Not identified.  MSI TESTING (under age 60): Results to be issued as an Addendum  ADDITIONAL PATHOLOGIC FINDINGS:  Simple cyst right ovary.  ANCILLARY STUDIES:  None performed.  AJCC PATHOLOGIC STAGE:  (COMPLETED BY PATHOLOGIST, BASED ONLY ON TISSUE FINDINGS, MORE EXTENSIVE DISEASE MAY NOT BE KNOWN TO THE PATHOLOGIST)  pT=  1a.  pN=    x  AJCC PATHOLOGIC STAGE:  IA.         ASSESSMENT/PLAN:  Bhavna Talbot returns for a post-operative evaluation today.  All pathology reports were given to patient.I recommended observation for this early endometrial cancer.       Overall, the patient is very pleased with her care.  I recommended continuation of post operative precautions as discussed.     She is to  follow up for survivorship in 3 months.      Patient was seen and examined with Dr. Dasilva,  resident, who performed portions of the examination and documentation for this patient's care under my direct supervision.  I agree with the above documentation and plan.    Shelly Rivera MD  06/06/19  5:38 PM

## 2019-06-12 ENCOUNTER — TELEPHONE (OUTPATIENT)
Dept: GYNECOLOGIC ONCOLOGY | Facility: CLINIC | Age: 69
End: 2019-06-12

## 2019-06-12 NOTE — TELEPHONE ENCOUNTER
I called and let patient know her disability paper work is ready to . She is to call back if she has questions.

## 2019-06-24 LAB
CYTO UR: NORMAL
LAB AP CASE REPORT: NORMAL
LAB AP CLINICAL INFORMATION: NORMAL
LAB AP INTEGRATED ONCOLOGY, ADDENDUM 2: NORMAL
LAB AP INTEGRATED ONCOLOGY, ADDENDUM: NORMAL
Lab: NORMAL
PATH REPORT.FINAL DX SPEC: NORMAL
PATH REPORT.GROSS SPEC: NORMAL

## 2019-06-27 ENCOUNTER — TELEPHONE (OUTPATIENT)
Dept: GYNECOLOGIC ONCOLOGY | Facility: CLINIC | Age: 69
End: 2019-06-27

## 2019-06-27 NOTE — TELEPHONE ENCOUNTER
----- Message from Gisela Solano sent at 6/27/2019 11:49 AM EDT -----      ----- Message -----  From: Shelly Rivera MD  Sent: 6/24/2019   3:11 PM  To: Gisela Mason, #    Thanks.    Clinical staff- can one of you please call her and let her know that the testing did not show a genetic link for her endometrial cancer and document the phone call so we can but this to bed?  Thanks!    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  So she does not need anything further.      Kerry    ----- Message -----  From: Shelly Rivera MD  Sent: 6/23/2019   9:21 PM  To: Gisela Mason    [facility], Addendum  DNA Mismatch Repair Protein Analysis by Immunohistochemistry  Part of the tumor reveals preserved expression of all MMR markers (MLH1, MSH2, MSH6, and PMS2). However, a portion of the tumor shows absent expression of the mismatch repair proteins MLH1 and  PMS2 and are suggestive of a deficient DNA mismatch repair function in this part of the tumor, probably due to hypermethylation.   Testing performed at outside laboratory. See scanned report.    Kerry - please let me know if referral or testing needed.  Thanks!

## 2019-06-27 NOTE — TELEPHONE ENCOUNTER
I called and left voicemail for patient with information that no genetic link to her endometrial cancer. She is to call if she would like a copy or if she has questions.

## 2019-06-28 ENCOUNTER — TELEPHONE (OUTPATIENT)
Dept: GYNECOLOGIC ONCOLOGY | Facility: CLINIC | Age: 69
End: 2019-06-28

## 2019-09-05 ENCOUNTER — CLINICAL SUPPORT (OUTPATIENT)
Dept: GYNECOLOGIC ONCOLOGY | Facility: CLINIC | Age: 69
End: 2019-09-05

## 2019-09-05 VITALS
WEIGHT: 229 LBS | TEMPERATURE: 97.8 F | HEART RATE: 66 BPM | DIASTOLIC BLOOD PRESSURE: 61 MMHG | RESPIRATION RATE: 18 BRPM | SYSTOLIC BLOOD PRESSURE: 119 MMHG | OXYGEN SATURATION: 98 % | BODY MASS INDEX: 39.31 KG/M2

## 2019-09-05 DIAGNOSIS — C54.1 ENDOMETRIAL CANCER (HCC): Primary | ICD-10-CM

## 2019-09-05 PROCEDURE — 99214 OFFICE O/P EST MOD 30 MIN: CPT | Performed by: NURSE PRACTITIONER

## 2019-09-05 NOTE — PROGRESS NOTES
GYN ONCOLOGY CANCER SURVIVORSHIP VISIT    Bhavna Talbot  [id number]  1950    Chief Complaint: Follow-up (no complaints)        History of present illness:  Bhavna Talbot is a 69 y.o. year old female who is here today for her Cancer Survivorship visit, see oncology history below. She reports she is feeling very well today and has no complaints. She denies vaginal bleeding, pelvic pain, and changes in bowel or bladder function. She feels well recovered from surgery. She is overall very pleased with her outcomes and care.      Cancer History:      Endometrial cancer (CMS/[facility])    4/19/2019 Procedure     Hysteroscopy D&C by Dr. Gross due to PMB, thickened endometrium, and inability to obtain EMB in office. Pathology revealed well-differentiated endometrioid adenocarcinoma         5/17/2019 Surgery     RTLH/BSO. Final path showed 1.5 x 1.0 x 0.4 cm tumor, invasive into 2/15 mm myometrium. No LVSI and all other structures negative. Stage IA grade 1    MSI testing showed absent MLH1 and PMS2 in a portion of the tumor. Reflex hypermethylation testing positive = sporadic tumor            Past Medical History:   Diagnosis Date   • Arthritis    • Asthma    • Atrial fibrillation and flutter (CMS/[facility])     patient states this only occurred once, felt to be precipiated by a medication   • Bipolar 1 disorder (CMS/[facility]) 1988   • Diabetes type 2, controlled (CMS/[facility])    • Diverticulitis 2016   • Endometrial cancer (CMS/[facility]) 5/9/2019   • Hearing loss 1970    right ear    • Heart murmur    • High blood pressure    • Hyperlipidemia    • Macular degeneration    • Skin cancer    • Sleep apnea 1992    does not use CPAP    • Wears glasses        Past Surgical History:   Procedure Laterality Date   • ADENOIDECTOMY     • COLONOSCOPY  2017   • EYE SURGERY Bilateral 2019   • INNER EAR SURGERY  1970   • REPLACEMENT TOTAL KNEE Left 2018   • REPLACEMENT TOTAL KNEE Right 2012   • SINUS SURGERY  1970   • SKIN BIOPSY     • TONSILLECTOMY      • TOTAL LAPAROSCOPIC HYSTERECTOMY, LAPAROSCOPIC NODE DISSECTION Bilateral 5/17/2019    Procedure: TOTAL LAPAROSCOPIC HYSTERECTOMY, BILATERAL SALPINGO OPHORECTOMY WITH DAVINCI ROBOT;  Surgeon: Shelly Rivera MD;  Location: [facility];  Service: DaVinci   • UVULOPLASTY  2000       MEDICATIONS: The current medication list was reviewed and reconciled.     Allergies:  is allergic to eggs or egg-derived products; keflex [cephalexin]; latex; and penicillins.    Family History   Problem Relation Age of Onset   • Alzheimer's disease Mother    • Esophageal cancer Father    • Diabetes Maternal Grandmother    • Colon cancer Paternal Grandfather    • Diabetes Paternal Uncle    • Heart attack Brother        Review of Systems   Constitutional: Negative for appetite change, chills, fatigue, fever and unexpected weight change.   Respiratory: Negative for cough, shortness of breath and wheezing.    Cardiovascular: Negative for chest pain, palpitations and leg swelling.   Gastrointestinal: Negative for abdominal distention, abdominal pain, blood in stool, constipation, diarrhea, nausea and vomiting.   Endocrine: Negative.    Genitourinary: Negative for dysuria, frequency, genital sores, hematuria, pelvic pain, urgency, vaginal bleeding, vaginal discharge and vaginal pain.   Musculoskeletal: Negative for arthralgias, gait problem and joint swelling.   Neurological: Negative for dizziness, seizures, syncope, weakness, light-headedness, numbness and headaches.   Hematological: Negative for adenopathy.   Psychiatric/Behavioral: Negative.        Physical Exam  Vital Signs: /61   Pulse 66   Temp 97.8 °F (36.6 °C) (Temporal)   Resp 18   Wt 104 kg (229 lb)   SpO2 98%   BMI 39.31 kg/m²   Pain Score    09/05/19 1305   PainSc: 0-No pain      General Appearance:  alert, cooperative, no apparent distress, appears stated age and obese   Neurologic/Psychiatric: A&O x 3, gait steady, appropriate affect   HEENT:  Normocephalic,  without obvious abnormality, mucous membranes moist   Neck: Supple, symmetrical, trachea midline, no adenopathy;  No thyromegaly, masses, or tenderness   Back:   Symmetric, no curvature, ROM normal, no CVA tenderness   Lungs:   Clear to auscultation bilaterally; respirations regular, even, and unlabored bilaterally   Heart:  Regular rate and rhythm, no murmurs appreciated   Breasts:  deferred   Abdomen:   Soft, non-tender, non-distended, no organomegaly and Exam limited d/t habitus.   Lymph nodes: No cervical, supraclavicular, inguinal adenopathy noted   Extremities: Normal, atraumatic; no clubbing, cyanosis, or edema    Pelvic: External Genitalia  without lesions or skin changes  Vagina  is pink, moist, without lesions.  and pediatric speculum required  Vaginal Cuff  Female Vaginal Cuff: smooth, intact and without visible lesions  Uterus  surgically absent and no palpable masses  Ovaries  surgically absent bilaterallly  Parametria  smooth  Rectovaginal  Female rectovaginal: deferred     ECOG Performance Status: 0 - Asymptomatic    Procedure Note:  No notes on file      Assessment and Plan:  Bhavna was seen today for follow-up.    Diagnoses and all orders for this visit:    Endometrial cancer (CMS/HCC)        There is no evidence of disease upon today's exam. Plan will be for every 6 month cancer surveillance for the first 2 years. These may be alternated between regular gynecologist and our office. She is understanding to call with any changes in pelvic symptoms or general GYN concerns at any time between regularly scheduled visits.     The patient and I have reviewed her Survivorship Care Plan in detail. We discussed her diagnosis, pathology, histology, all treatments, and ongoing surveillance recommendations. All questions were answered to her satisfaction. She is in agreement with our plan for ongoing surveillance as outlined in her plan. A copy of this document was provided to her at the completion of our visit.   A copy has also been sent to her primary care provider and gynecologist.      This was a 30 minute direct face to face visit with 20 minutes spent in review of her Survivorship Care Plan.    Return to clinic in 6 months for ongoing cancer surveillance.      SHAHRZAD Stringer

## 2020-03-05 ENCOUNTER — OFFICE VISIT (OUTPATIENT)
Dept: GYNECOLOGIC ONCOLOGY | Facility: CLINIC | Age: 70
End: 2020-03-05

## 2020-03-05 VITALS
RESPIRATION RATE: 16 BRPM | WEIGHT: 225.8 LBS | HEART RATE: 54 BPM | OXYGEN SATURATION: 91 % | SYSTOLIC BLOOD PRESSURE: 128 MMHG | DIASTOLIC BLOOD PRESSURE: 58 MMHG | TEMPERATURE: 97.9 F | BODY MASS INDEX: 38.55 KG/M2 | HEIGHT: 64 IN

## 2020-03-05 DIAGNOSIS — C54.1 ENDOMETRIAL CANCER (HCC): Primary | ICD-10-CM

## 2020-03-05 PROCEDURE — 99213 OFFICE O/P EST LOW 20 MIN: CPT | Performed by: NURSE PRACTITIONER

## 2020-03-05 RX ORDER — LEVOCETIRIZINE DIHYDROCHLORIDE 5 MG/1
5 TABLET, FILM COATED ORAL EVERY EVENING
COMMUNITY

## 2020-03-05 RX ORDER — IPRATROPIUM BROMIDE 42 UG/1
2 SPRAY, METERED NASAL 3 TIMES DAILY
COMMUNITY

## 2020-03-05 NOTE — PROGRESS NOTES
GYN ONCOLOGY CANCER SURVEILLANCE FOLLOW-UP    Bhavna Talbot  [id number]  1950    Chief Complaint: Follow-up (no complaints)        History of present illness:  Bhavna Talbot is a 69 y.o. year old female who is here today for ongoing surveillance of Endometrial Cancer, see Cancer History. She will reach 1 year from completion of treatment with surgery alone later this spring. She reports she is feeling very well today and has no complaints. She denies vaginal bleeding, pelvic pain, and changes in bowel or bladder function. She works for the [employer] as an  to the [employer] and will be returning to work today.           Cancer History:      Endometrial cancer (CMS/[facility])    4/19/2019 Procedure     Hysteroscopy D&C by Dr. Gross due to PMB, thickened endometrium, and inability to obtain EMB in office. Pathology revealed well-differentiated endometrioid adenocarcinoma      5/17/2019 Surgery     RTLH/BSO. Final path showed 1.5 x 1.0 x 0.4 cm tumor, invasive into 2/15 mm myometrium. No LVSI and all other structures negative. Stage IA grade 1    MSI testing showed absent MLH1 and PMS2 in a portion of the tumor. Reflex hypermethylation testing positive = sporadic tumor         Past Medical History:   Diagnosis Date   • Arthritis    • Asthma    • Atrial fibrillation and flutter (CMS/[facility])     patient states this only occurred once, felt to be precipiated by a medication   • Bipolar 1 disorder (CMS/[facility]) 1988   • Diabetes type 2, controlled (CMS/[facility])    • Diverticulitis 2016   • Endometrial cancer (CMS/[facility]) 5/9/2019   • Hearing loss 1970    right ear    • Heart murmur    • High blood pressure    • Hyperlipidemia    • Macular degeneration    • Skin cancer    • Sleep apnea 1992    does not use CPAP    • Wears glasses        Past Surgical History:   Procedure Laterality Date   • ADENOIDECTOMY     • COLONOSCOPY  2017   • EYE SURGERY Bilateral 2019   • INNER EAR SURGERY  1970   • REPLACEMENT  "TOTAL KNEE Left 2018   • REPLACEMENT TOTAL KNEE Right 2012   • SINUS SURGERY  1970   • SKIN BIOPSY     • TONSILLECTOMY     • TOTAL LAPAROSCOPIC HYSTERECTOMY, LAPAROSCOPIC NODE DISSECTION Bilateral 5/17/2019    Procedure: TOTAL LAPAROSCOPIC HYSTERECTOMY, BILATERAL SALPINGO OPHORECTOMY WITH DAVINCI ROBOT;  Surgeon: Shelly Rivera MD;  Location: [facility];  Service: DaVinci   • UVULOPLASTY  2000       MEDICATIONS: The current medication list was reviewed and reconciled.     Allergies:  is allergic to eggs or egg-derived products; keflex [cephalexin]; latex; and penicillins.    Family History   Problem Relation Age of Onset   • Alzheimer's disease Mother    • Esophageal cancer Father    • Diabetes Maternal Grandmother    • Colon cancer Paternal Grandfather    • Diabetes Paternal Uncle    • Heart attack Brother            Review of Systems   Constitutional: Negative for appetite change, chills, fatigue, fever and unexpected weight change.   Respiratory: Negative for cough, shortness of breath and wheezing.    Cardiovascular: Negative for chest pain, palpitations and leg swelling.   Gastrointestinal: Negative for abdominal distention, abdominal pain, blood in stool, constipation, diarrhea, nausea and vomiting.   Endocrine: Negative.    Genitourinary: Negative for dyspareunia, dysuria, frequency, genital sores, hematuria, pelvic pain, urgency, vaginal bleeding, vaginal discharge and vaginal pain.   Musculoskeletal: Negative for arthralgias, gait problem and joint swelling.   Neurological: Negative for dizziness, seizures, syncope, weakness, light-headedness, numbness and headaches.   Hematological: Negative for adenopathy.   Psychiatric/Behavioral: Negative.        PHQ-9 Total Score: 0      Physical Exam  Vital Signs: /58   Pulse 54   Temp 97.9 °F (36.6 °C) (Temporal)   Resp 16   Ht 162.6 cm (64.02\")   Wt 102 kg (225 lb 12.8 oz)   SpO2 91%   BMI 38.74 kg/m²   Vitals:    03/05/20 0930   PainSc: 0-No pain "           General Appearance:  alert, cooperative, no apparent distress, appears stated age and obese   Neurologic/Psychiatric: A&O x 3, gait steady, appropriate affect   HEENT:  Normocephalic, without obvious abnormality, mucous membranes moist   Neck: Supple, symmetrical, trachea midline, no adenopathy;  No thyromegaly, masses, or tenderness   Back:   Symmetric, no curvature, ROM normal, no CVA tenderness   Lungs:   Clear to auscultation bilaterally; respirations regular, even, and unlabored bilaterally   Heart:  Regular rate and rhythm, no murmurs appreciated   Breasts:  deferred   Abdomen:   Soft, non-tender, non-distended, no organomegaly and Exam limited d/t habitus.   Lymph nodes: No cervical, supraclavicular, inguinal adenopathy noted   Extremities: Normal, atraumatic; no clubbing, cyanosis, or edema    Pelvic: External Genitalia  without lesions or skin changes  Vagina  is pink, moist, without lesions.   Vaginal Cuff  Female Vaginal Cuff: smooth, intact and without visible lesions  Uterus  surgically absent and no palpable masses  Ovaries  surgically absent bilaterally  Parametria  smooth  Rectovaginal  Female rectovaginal: deferred     ECOG Performance Status: (0) Fully Active - Able to Carry On All Pre-disease Performance Without Restriction    Procedure Note:  No notes on file      Assessment and Plan:  Bhavna was seen today for follow-up.    Diagnoses and all orders for this visit:    Endometrial cancer (CMS/HCC)        There is no evidence of disease upon today's exam. Continue every 6 month cancer surveillance until the 2 year ernestina, then yearly if doing well. She is understanding to call with any changes in pelvic symptoms or general GYN concerns at any time between regularly scheduled visits.     Pain assessment was performed today as a part of patient’s care.  For patients with pain related to surgery, gynecologic malignancy or cancer treatment, the plan is as noted in the assessment/plan.  For  patients with pain not related to these issues, they are to seek any further needed care from a more appropriate provider, such as PCP. No pain today.     Advance Care Planning   ACP discussion was held with the patient during this visit. Patient has an advance directive (not in EMR), copy requested.       Return to clinic in 6 months for ongoing cancer surveillance.      Cassandra Morales, APRN

## 2020-09-04 ENCOUNTER — OFFICE VISIT (OUTPATIENT)
Dept: GYNECOLOGIC ONCOLOGY | Facility: CLINIC | Age: 70
End: 2020-09-04

## 2020-09-04 VITALS
HEART RATE: 59 BPM | OXYGEN SATURATION: 97 % | TEMPERATURE: 97.1 F | RESPIRATION RATE: 18 BRPM | HEIGHT: 64 IN | WEIGHT: 233 LBS | BODY MASS INDEX: 39.78 KG/M2 | DIASTOLIC BLOOD PRESSURE: 58 MMHG | SYSTOLIC BLOOD PRESSURE: 104 MMHG

## 2020-09-04 DIAGNOSIS — C54.1 ENDOMETRIAL CANCER (HCC): Primary | ICD-10-CM

## 2020-09-04 DIAGNOSIS — B37.2 SKIN YEAST INFECTION: ICD-10-CM

## 2020-09-04 PROCEDURE — 99213 OFFICE O/P EST LOW 20 MIN: CPT | Performed by: NURSE PRACTITIONER

## 2020-09-04 RX ORDER — NYSTATIN 100000 [USP'U]/G
POWDER TOPICAL 2 TIMES DAILY
Qty: 60 G | Refills: 1 | Status: SHIPPED | OUTPATIENT
Start: 2020-09-04 | End: 2023-03-20 | Stop reason: SDUPTHER

## 2020-09-04 NOTE — PROGRESS NOTES
GYN ONCOLOGY CANCER SURVEILLANCE FOLLOW-UP    Bhavna Talbot  [id number]  1950    Chief Complaint: Follow-up (no complaints)        History of present illness:  Bhavna Talbot is a 70 y.o. year old female who is here today for ongoing surveillance of Endometrial Cancer, see Cancer History. She reports she is feeling very well today and has no complaints. She denies vaginal bleeding, pelvic pain, and changes in bowel or bladder function.  She had a negative COVID test through her employer yesterday--works for the local [employer].         Cancer History:      Endometrial cancer (CMS/[facility])    4/19/2019 Procedure     Hysteroscopy D&C by Dr. Gross due to PMB, thickened endometrium, and inability to obtain EMB in office. Pathology revealed well-differentiated endometrioid adenocarcinoma      5/17/2019 Surgery     RTLH/BSO. Final path showed 1.5 x 1.0 x 0.4 cm tumor, invasive into 2/15 mm myometrium. No LVSI and all other structures negative. Stage IA grade 1    MSI testing showed absent MLH1 and PMS2 in a portion of the tumor. Reflex hypermethylation testing positive = sporadic tumor      9/5/2019 Survivorship     Survivorship Care Plan completed and discussed with patient.  Copy of Survivorship Care Plan provided to patient and primary care provider.          Past Medical History:   Diagnosis Date   • Arthritis    • Asthma    • Atrial fibrillation and flutter (CMS/[facility])     patient states this only occurred once, felt to be precipiated by a medication   • Bipolar 1 disorder (CMS/[facility]) 1988   • Diabetes type 2, controlled (CMS/[facility])    • Diverticulitis 2016   • Endometrial cancer (CMS/[facility]) 5/9/2019   • Hearing loss 1970    right ear    • Heart murmur    • High blood pressure    • Hyperlipidemia    • Macular degeneration    • Skin cancer    • Sleep apnea 1992    does not use CPAP    • Wears glasses        Past Surgical History:   Procedure Laterality Date   • ADENOIDECTOMY     • COLONOSCOPY  2017   • EYE  "SURGERY Bilateral 2019   • INNER EAR SURGERY  1970   • REPLACEMENT TOTAL KNEE Left 2018   • REPLACEMENT TOTAL KNEE Right 2012   • SINUS SURGERY  1970   • SKIN BIOPSY     • TONSILLECTOMY     • TOTAL LAPAROSCOPIC HYSTERECTOMY, LAPAROSCOPIC NODE DISSECTION Bilateral 5/17/2019    Procedure: TOTAL LAPAROSCOPIC HYSTERECTOMY, BILATERAL SALPINGO OPHORECTOMY WITH DAVINCI ROBOT;  Surgeon: Shelly Rivera MD;  Location: [facility];  Service: DaVinci   • UVULOPLASTY  2000       MEDICATIONS: The current medication list was reviewed and reconciled.     Allergies:  is allergic to eggs or egg-derived products; keflex [cephalexin]; latex; and penicillins.    Family History   Problem Relation Age of Onset   • Alzheimer's disease Mother    • Esophageal cancer Father    • Diabetes Maternal Grandmother    • Colon cancer Paternal Grandfather    • Diabetes Paternal Uncle    • Heart attack Brother        Review of Systems   Constitutional: Negative for appetite change, chills, fatigue, fever and unexpected weight change.   Respiratory: Negative for cough, shortness of breath and wheezing.    Cardiovascular: Negative for chest pain, palpitations and leg swelling.   Gastrointestinal: Negative for abdominal distention, abdominal pain, blood in stool, constipation, diarrhea, nausea and vomiting.   Endocrine: Negative.    Genitourinary: Negative for dyspareunia, dysuria, frequency, genital sores, hematuria, pelvic pain, urgency, vaginal bleeding, vaginal discharge and vaginal pain.   Musculoskeletal: Negative for arthralgias, gait problem and joint swelling.   Neurological: Negative for dizziness, seizures, syncope, weakness, light-headedness, numbness and headaches.   Hematological: Negative for adenopathy.   Psychiatric/Behavioral: Negative.        Physical Exam  Vital Signs: /58   Pulse 59   Temp 97.1 °F (36.2 °C) (Temporal)   Resp 18   Ht 162.6 cm (64.02\")   Wt 106 kg (233 lb)   SpO2 97%   BMI 39.97 kg/m²   Vitals:    " 09/04/20 0835   PainSc: 0-No pain           General Appearance:  alert, cooperative, no apparent distress, appears stated age and obese   Neurologic/Psychiatric: A&O x 3, gait steady, appropriate affect   HEENT:  Normocephalic, without obvious abnormality, mucous membranes moist   Lungs:   Clear to auscultation bilaterally; respirations regular, even, and unlabored bilaterally   Heart:  Regular rate and rhythm, no murmurs appreciated   Breasts:  deferred   Abdomen:   Soft, non-tender, non-distended, no organomegaly and Exam limited d/t habitus. Candidiasis noted to left pannus fold   Lymph nodes: No cervical, supraclavicular, inguinal adenopathy noted   Extremities: Normal, atraumatic; no clubbing, cyanosis, or edema    Pelvic: External Genitalia  atrophic, without lesions, changes consistent with lichen sclerosus (asymptomatic)  Vagina  is pale, atrophic.   Vaginal Cuff  Female Vaginal Cuff: smooth, intact and without visible lesions  Uterus  surgically absent and no palpable masses  Ovaries  surgically absent bilaterally  Parametria  smooth  Rectovaginal  Female rectovaginal: deferred     ECOG Performance Status: (0) Fully Active - Able to Carry On All Pre-disease Performance Without Restriction    Procedure Note:  No notes on file      Assessment and Plan:  Bhavna was seen today for follow-up.    Diagnoses and all orders for this visit:    Endometrial cancer (CMS/HCC)    Skin yeast infection  Comments:  left pannus fold  -     nystatin (MYCOSTATIN) 064647 UNIT/GM powder; Apply  topically to the appropriate area as directed 2 (Two) Times a Day.          There is no evidence of disease upon today's exam. Continue every 6 month visits until the 2 year ernestina. She is understanding to call with any changes in pelvic symptoms or general GYN concerns at any time between regularly scheduled visits.     Nystatin powder sent to pharmacy for candidiasis.     Pain assessment was performed today as a part of patient’s care.  For  patients with pain related to surgery, gynecologic malignancy or cancer treatment, the plan is as noted in the assessment/plan.  For patients with pain not related to these issues, they are to seek any further needed care from a more appropriate provider, such as PCP.      Return to clinic in 6 months for ongoing cancer surveillance.      Electronically signed by SHAHRZAD Stringer on 09/04/20 at 09:16

## 2021-03-09 ENCOUNTER — OFFICE VISIT (OUTPATIENT)
Dept: GYNECOLOGIC ONCOLOGY | Facility: CLINIC | Age: 71
End: 2021-03-09

## 2021-03-09 VITALS
HEIGHT: 64 IN | SYSTOLIC BLOOD PRESSURE: 127 MMHG | RESPIRATION RATE: 16 BRPM | TEMPERATURE: 97.5 F | WEIGHT: 241.5 LBS | HEART RATE: 78 BPM | BODY MASS INDEX: 41.23 KG/M2 | DIASTOLIC BLOOD PRESSURE: 64 MMHG | OXYGEN SATURATION: 92 %

## 2021-03-09 DIAGNOSIS — C54.1 ENDOMETRIAL CANCER (HCC): Primary | ICD-10-CM

## 2021-03-09 PROCEDURE — 99213 OFFICE O/P EST LOW 20 MIN: CPT | Performed by: NURSE PRACTITIONER

## 2021-03-09 NOTE — PROGRESS NOTES
GYN ONCOLOGY CANCER SURVEILLANCE FOLLOW-UP    Bhavna Talbot  [id number]  1950    Subjective   Chief Complaint: Follow-up (no complaints)        History of present illness:     Bhavna Talbot is a 70 y.o. year old female who is here today for ongoing surveillance of Endometrial Cancer, see Cancer History. She will reach 2 years from completion of treatment with surgery alone this spring. She reports she is feeling very well today and has no complaints. She denies vaginal bleeding, pelvic pain, and changes in bowel or bladder function. Only change in medial history since last visit is a fall in January 2021. She is now seeing PT for balance improvement. She is also following up with her eye doctor and a hand specialist later this week.           Cancer History:   Oncology/Hematology History   Endometrial cancer (CMS/HCC)   4/19/2019 Procedure    Hysteroscopy D&C by Dr. Gross due to PMB, thickened endometrium, and inability to obtain EMB in office. Pathology revealed well-differentiated endometrioid adenocarcinoma     5/17/2019 Surgery    RTLH/BSO. Final path showed 1.5 x 1.0 x 0.4 cm tumor, invasive into 2/15 mm myometrium. No LVSI and all other structures negative. Stage IA grade 1    MSI testing showed absent MLH1 and PMS2 in a portion of the tumor. Reflex hypermethylation testing positive = sporadic tumor     9/5/2019 Survivorship    Survivorship Care Plan completed and discussed with patient.  Copy of Survivorship Care Plan provided to patient and primary care provider.            The current medication list and allergy list were reviewed and reconciled.     Past Medical History, Past Surgical History, Social History, Family History have been reviewed and are without significant changes except as mentioned.      Review of Systems   Gastrointestinal: Negative.    Genitourinary: Negative.    Musculoskeletal: Positive for arthralgias.   Neurological: Positive for weakness (balance trouble, improving with  "PT).         Objective   Physical Exam  Vital Signs: /64   Pulse 78   Temp 97.5 °F (36.4 °C) (Temporal)   Resp 16   Ht 162.6 cm (64.02\")   Wt 110 kg (241 lb 8 oz)   SpO2 92%   BMI 41.43 kg/m²   Vitals:    03/09/21 0904   PainSc: 0-No pain           General Appearance:  alert, cooperative, no apparent distress, appears stated age and obese   Neurologic/Psychiatric: A&O x 3, gait steady, appropriate affect   HEENT:  Normocephalic, without obvious abnormality, mucous membranes moist   Abdomen:   Soft, non-tender, non-distended, no organomegaly and Exam limited d/t habitus.   Lymph nodes: No cervical, supraclavicular, inguinal adenopathy noted   Pelvic: External Genitalia  without lesions or skin changes  Vagina  is pale, atrophic.   Vaginal Cuff  Female Vaginal Cuff: smooth, intact and without visible lesions  Uterus  surgically absent, no palpable masses and exam limited d/t habitus  Ovaries  surgically absent bilaterally  Parametria  smooth  Rectovaginal  Female rectovaginal: deferred     ECOG score: 0     Karnofsky score: 100       PHQ-9 Total Score: 0    Procedure Note:  No notes on file         Assessment and Plan:    Diagnoses and all orders for this visit:    1. Endometrial cancer (CMS/HCC) (Primary)          There is no evidence of disease upon today's exam. She is approaching 2 years since completion of treatment with surgery alone and is doing very well. She may now go to yearly cancer surveillance visits. She is understanding to call with any changes in pelvic symptoms or general GYN concerns at any time between regularly scheduled visits.     Keep follow-ups with PT, PCP, and other specialists as scheduled.     Pain assessment was performed today as a part of patient’s care.  For patients with pain related to surgery, gynecologic malignancy or cancer treatment, the plan is as noted in the assessment/plan.  For patients with pain not related to these issues, they are to seek any further needed " care from a more appropriate provider, such as PCP.      Follow-up:     Return to clinic in 1 year for ongoing cancer surveillance.      Electronically signed by SHAHRZAD Stringer on 03/09/21 at 09:33 EST

## 2021-03-24 ENCOUNTER — APPOINTMENT (OUTPATIENT)
Dept: PREADMISSION TESTING | Facility: HOSPITAL | Age: 71
End: 2021-03-24

## 2021-03-24 LAB — SARS-COV-2 RNA NOSE QL NAA+PROBE: NOT DETECTED

## 2021-03-24 PROCEDURE — C9803 HOPD COVID-19 SPEC COLLECT: HCPCS

## 2021-03-24 PROCEDURE — U0004 COV-19 TEST NON-CDC HGH THRU: HCPCS

## 2021-03-26 ENCOUNTER — LAB REQUISITION (OUTPATIENT)
Dept: LAB | Facility: HOSPITAL | Age: 71
End: 2021-03-26

## 2021-03-26 DIAGNOSIS — S60.551A SUPERFICIAL FOREIGN BODY OF RIGHT HAND, INITIAL ENCOUNTER: ICD-10-CM

## 2021-03-26 PROCEDURE — 88307 TISSUE EXAM BY PATHOLOGIST: CPT | Performed by: PLASTIC SURGERY

## 2021-03-30 LAB
CYTO UR: NORMAL
LAB AP CASE REPORT: NORMAL
LAB AP CLINICAL INFORMATION: NORMAL
PATH REPORT.FINAL DX SPEC: NORMAL
PATH REPORT.GROSS SPEC: NORMAL

## 2022-03-16 ENCOUNTER — OFFICE VISIT (OUTPATIENT)
Dept: GYNECOLOGIC ONCOLOGY | Facility: CLINIC | Age: 72
End: 2022-03-16

## 2022-03-16 VITALS
RESPIRATION RATE: 24 BRPM | DIASTOLIC BLOOD PRESSURE: 66 MMHG | HEART RATE: 74 BPM | TEMPERATURE: 97.5 F | BODY MASS INDEX: 42.35 KG/M2 | SYSTOLIC BLOOD PRESSURE: 111 MMHG | OXYGEN SATURATION: 99 % | HEIGHT: 63 IN | WEIGHT: 239 LBS

## 2022-03-16 DIAGNOSIS — Z85.42 HISTORY OF ENDOMETRIAL CANCER: Primary | ICD-10-CM

## 2022-03-16 DIAGNOSIS — E66.01 CLASS 3 SEVERE OBESITY DUE TO EXCESS CALORIES WITH SERIOUS COMORBIDITY AND BODY MASS INDEX (BMI) OF 40.0 TO 44.9 IN ADULT: ICD-10-CM

## 2022-03-16 PROBLEM — I34.0 MITRAL VALVE REGURGITATION: Status: ACTIVE | Noted: 2021-02-03

## 2022-03-16 PROCEDURE — 99213 OFFICE O/P EST LOW 20 MIN: CPT | Performed by: NURSE PRACTITIONER

## 2022-03-16 RX ORDER — MICONAZOLE NITRATE 20 MG/G
POWDER TOPICAL
COMMUNITY

## 2022-03-16 RX ORDER — FLUTICASONE PROPIONATE 50 MCG
1 SPRAY, SUSPENSION (ML) NASAL
COMMUNITY

## 2022-03-16 NOTE — PROGRESS NOTES
GYN ONCOLOGY CANCER SURVEILLANCE FOLLOW-UP    Bhavna Talbot  [id number]  1950    Subjective   Chief Complaint: Uterine Cancer (No complaints)        History of present illness:     Bhavna Talbot is a 71 y.o. year old female who is here today for ongoing surveillance of Endometrial Cancer, see Cancer History. She will reach 3 years from completion of treatment with surgery alone this spring. She reports she is feeling very well today and has no complaints. She denies vaginal bleeding, pelvic pain, and changes in bowel or bladder function. All well woman screenings are UTD, followed by her PCP. She reports getting to over 250 lbs in early 2022 and wanting to make some lifestyle changes. She has found the [employer] weight loss program through [facility] therapy and has lost 13 lbs per her home scale. She is very motivated to continue her weight loss journey and is enjoying learning about healthier habits.        Cancer History:   Oncology/Hematology History   Endometrial cancer (HCC)   4/19/2019 Procedure    Hysteroscopy D&C by Dr. Gross due to PMB, thickened endometrium, and inability to obtain EMB in office. Pathology revealed well-differentiated endometrioid adenocarcinoma     5/17/2019 Surgery    RTLH/BSO. Final path showed 1.5 x 1.0 x 0.4 cm tumor, invasive into 2/15 mm myometrium. No LVSI and all other structures negative. Stage IA grade 1    MSI testing showed absent MLH1 and PMS2 in a portion of the tumor. Reflex hypermethylation testing positive = sporadic tumor     9/5/2019 Survivorship    Survivorship Care Plan completed and discussed with patient.  Copy of Survivorship Care Plan provided to patient and primary care provider.            The current medication list and allergy list were reviewed and reconciled.     Past Medical History, Past Surgical History, Social History, Family History have been reviewed and are without significant changes except as mentioned.      Review of Systems  "  Gastrointestinal: Negative.    Genitourinary: Negative.    Musculoskeletal: Positive for arthralgias.         Objective   Physical Exam  Vital Signs: /66 Comment: LUE  Pulse 74   Temp 97.5 °F (36.4 °C) (Infrared)   Resp 24   Ht 158.8 cm (62.5\")   Wt 108 kg (239 lb)   SpO2 99% Comment: RA  BMI 43.02 kg/m²    Wt Readings from Last 10 Encounters:   03/16/22 108 kg (239 lb)   03/09/21 110 kg (241 lb 8 oz)   09/04/20 106 kg (233 lb)   03/05/20 102 kg (225 lb 12.8 oz)   09/05/19 104 kg (229 lb)   06/05/19 104 kg (230 lb)   05/17/19 108 kg (237 lb)   05/16/19 108 kg (237 lb 14 oz)   05/09/19 109 kg (240 lb 12.8 oz)       Vitals:    03/16/22 0931   PainSc: 0-No pain           General Appearance:  alert, cooperative, no apparent distress, appears stated age and obese by BMI criteria   Neurologic/Psychiatric: A&O x 3, gait steady, appropriate affect   HEENT:  Normocephalic, without obvious abnormality, mucous membranes moist   Abdomen:   Soft, non-tender, non-distended, no organomegaly and Exam limited d/t habitus.   Lymph nodes: No cervical, supraclavicular, inguinal adenopathy noted   Pelvic: External Genitalia  without lesions or skin changes  Vagina  is pale, atrophic.   Vaginal Cuff  Female Vaginal Cuff: smooth, intact and without visible lesions  Uterus  surgically absent, no palpable masses and exam limited d/t habitus  Ovaries  surgically absent bilaterally  Parametria  smooth  Rectovaginal  Female rectovaginal: deferred     ECOG score: 0                    Assessment and Plan:    Diagnoses and all orders for this visit:    1. History of endometrial cancer (Primary)    2. Class 3 severe obesity due to excess calories with serious comorbidity and body mass index (BMI) of 40.0 to 44.9 in adult (HCC)          There is no evidence of disease upon today's exam. She is approaching 3 years since completion of treatment with surgery alone and is doing very well. Continue yearly cancer surveillance visits until " the 5 year ernestina. She is understanding to call with any changes in pelvic symptoms or general GYN concerns at any time between regularly scheduled visits.     I am pleased to hear patient is involved with a new weight loss program. She is seeing results thus far and feels this is a maintainable lifestyle program. She is keeping diet logs and learning how to make healthier food choices. She is enjoying the community, encouragement, and accountability through the program. She is motivated toward long-term weight loss and we look forward to seeing her progress.     Pain assessment was performed today as a part of patient’s care.  For patients with pain related to surgery, gynecologic malignancy or cancer treatment, the plan is as noted in the assessment/plan.  For patients with pain not related to these issues, they are to seek any further needed care from a more appropriate provider, such as PCP.      Follow-up:     Return to clinic in 1 year for ongoing cancer surveillance.      Electronically signed by SHAHRZAD Stringer on 03/16/22 at 10:24 EDT

## 2023-03-16 ENCOUNTER — OFFICE VISIT (OUTPATIENT)
Dept: GYNECOLOGIC ONCOLOGY | Facility: CLINIC | Age: 73
End: 2023-03-16
Payer: COMMERCIAL

## 2023-03-16 VITALS
TEMPERATURE: 97.8 F | HEART RATE: 66 BPM | RESPIRATION RATE: 18 BRPM | SYSTOLIC BLOOD PRESSURE: 116 MMHG | HEIGHT: 64 IN | DIASTOLIC BLOOD PRESSURE: 63 MMHG | OXYGEN SATURATION: 97 % | WEIGHT: 211.9 LBS | BODY MASS INDEX: 36.18 KG/M2

## 2023-03-16 DIAGNOSIS — Z85.42 HISTORY OF ENDOMETRIAL CANCER: Primary | ICD-10-CM

## 2023-03-16 PROCEDURE — 1160F RVW MEDS BY RX/DR IN RCRD: CPT | Performed by: NURSE PRACTITIONER

## 2023-03-16 PROCEDURE — 1126F AMNT PAIN NOTED NONE PRSNT: CPT | Performed by: NURSE PRACTITIONER

## 2023-03-16 PROCEDURE — 1159F MED LIST DOCD IN RCRD: CPT | Performed by: NURSE PRACTITIONER

## 2023-03-16 PROCEDURE — 3074F SYST BP LT 130 MM HG: CPT | Performed by: NURSE PRACTITIONER

## 2023-03-16 PROCEDURE — 99212 OFFICE O/P EST SF 10 MIN: CPT | Performed by: NURSE PRACTITIONER

## 2023-03-16 PROCEDURE — 3078F DIAST BP <80 MM HG: CPT | Performed by: NURSE PRACTITIONER

## 2023-03-16 NOTE — PROGRESS NOTES
GYN ONCOLOGY CANCER SURVEILLANCE FOLLOW-UP    Bhavna Talbot  [id number]  1950    Subjective   Chief Complaint: Uterine Cancer (No gyn complaints)        History of present illness:     Bhavna Talbot is a 72 y.o. year old female who is here today for ongoing surveillance of Endometrial Cancer, see Cancer History. She will reach 4 years from completion of treatment with surgery alone this spring. She reports she is feeling very well today and has no complaints. She denies vaginal bleeding, pelvic pain, and changes in bowel or bladder function. All well woman screenings are UTD, followed by her PCP. She is down another 28 lbs since last year with the [employer] weight loss program through [facility]. She traveled to [place] last month as was able to be active and go sight-seeing independently which enhanced her wonderful experience. She is very motivated to continue her weight loss journey and to continue traveling when the opportunities arise.        Cancer History:   Oncology/Hematology History   Endometrial cancer (HCC)   4/19/2019 Procedure    Hysteroscopy D&C by Dr. Gross due to PMB, thickened endometrium, and inability to obtain EMB in office. Pathology revealed well-differentiated endometrioid adenocarcinoma     5/17/2019 Surgery    RTLH/BSO. Final path showed 1.5 x 1.0 x 0.4 cm tumor, invasive into 2/15 mm myometrium. No LVSI and all other structures negative. Stage IA grade 1    MSI testing showed absent MLH1 and PMS2 in a portion of the tumor. Reflex hypermethylation testing positive = sporadic tumor     9/5/2019 Survivorship    Survivorship Care Plan completed and discussed with patient.  Copy of Survivorship Care Plan provided to patient and primary care provider.            The current medication list and allergy list were reviewed and reconciled.     Past Medical History, Past Surgical History, Social History, Family History have been reviewed and are without significant changes except as  "mentioned.      Review of Systems   Gastrointestinal: Negative.    Genitourinary: Negative.    Musculoskeletal: Positive for arthralgias.         Objective   Physical Exam  Vital Signs: /63   Pulse 66   Temp 97.8 °F (36.6 °C) (Temporal)   Resp 18   Ht 162.6 cm (64\")   Wt 96.1 kg (211 lb 14.4 oz)   SpO2 97%   BMI 36.37 kg/m²    Wt Readings from Last 10 Encounters:   03/16/23 96.1 kg (211 lb 14.4 oz)   03/16/22 108 kg (239 lb)   03/09/21 110 kg (241 lb 8 oz)   09/04/20 106 kg (233 lb)   03/05/20 102 kg (225 lb 12.8 oz)   09/05/19 104 kg (229 lb)   06/05/19 104 kg (230 lb)   05/17/19 108 kg (237 lb)   05/16/19 108 kg (237 lb 14 oz)   05/09/19 109 kg (240 lb 12.8 oz)       Vitals:    03/16/23 0846   PainSc: 0-No pain           General Appearance:  alert, cooperative, no apparent distress, appears stated age and obese by BMI criteria   Neurologic/Psychiatric: A&O x 3, gait steady, appropriate affect   HEENT:  Normocephalic, without obvious abnormality, mucous membranes moist   Abdomen:   Soft, non-tender, non-distended, no organomegaly and Exam limited d/t habitus.   Lymph nodes: No cervical, supraclavicular, inguinal adenopathy noted   Pelvic: External Genitalia  without lesions or skin changes  Vagina  is pale, atrophic.   Vaginal Cuff  Female Vaginal Cuff: smooth, intact and without visible lesions  Uterus  surgically absent, no palpable masses and exam limited d/t habitus  Ovaries  surgically absent bilaterally  Parametria  smooth  Rectovaginal  Female rectovaginal: deferred     ECOG score: 0                    Assessment and Plan:    Diagnoses and all orders for this visit:    1. History of endometrial cancer (Primary)        There is no evidence of disease upon today's exam. She is approaching 4 years since completion of treatment with surgery alone and is doing very well. Continue yearly cancer surveillance visits until the 5 year ernestina. She is understanding to call with any changes in pelvic symptoms " or general GYN concerns at any time between regularly scheduled visits.     Patient congratulated on her continued success with her weight loss. It is great to see how much she is enjoying travel, work, and time with friends.      Pain assessment was performed today as a part of patient’s care.  For patients with pain related to surgery, gynecologic malignancy or cancer treatment, the plan is as noted in the assessment/plan.  For patients with pain not related to these issues, they are to seek any further needed care from a more appropriate provider, such as PCP.      Follow-up:     Return to clinic in 1 year for ongoing cancer surveillance.      Electronically signed by SHAHRZAD Stringer on 03/16/23 at 09:24 EDT

## 2023-03-20 RX ORDER — NYSTATIN 100000 [USP'U]/G
POWDER TOPICAL 2 TIMES DAILY
Qty: 60 G | Refills: 1 | Status: SHIPPED | OUTPATIENT
Start: 2023-03-20

## 2023-03-20 NOTE — TELEPHONE ENCOUNTER
Caller: Bhavna Talbot    Relationship: Self    Best call back number: [phone]    Requested Prescriptions:   Requested Prescriptions     Pending Prescriptions Disp Refills   • nystatin (MYCOSTATIN) [id number] UNIT/GM powder 60 g 1     Sig: Apply  topically to the appropriate area as directed 2 (Two) Times a Day.        Pharmacy where request should be sent: [place] [address] - [phone] [facility] [phone] FX     Additional details provided by patient: NEEDS NEW RX SENT TO DIFFERENT PHARMACY    Does the patient have less than a 3 day supply:  [x] Yes  [] No    Would you like a call back once the refill request has been completed: [x] Yes [] No    If the office needs to give you a call back, can they leave a voicemail: [x] Yes [] No    Yumi Pradhan Rep   03/20/23 09:01 EDT

## 2023-05-19 ENCOUNTER — OFFICE VISIT (OUTPATIENT)
Dept: GYNECOLOGIC ONCOLOGY | Facility: CLINIC | Age: 73
End: 2023-05-19
Payer: MEDICARE

## 2023-05-19 VITALS
HEART RATE: 66 BPM | RESPIRATION RATE: 20 BRPM | HEIGHT: 64 IN | SYSTOLIC BLOOD PRESSURE: 122 MMHG | WEIGHT: 218.2 LBS | DIASTOLIC BLOOD PRESSURE: 58 MMHG | TEMPERATURE: 97.2 F | OXYGEN SATURATION: 94 % | BODY MASS INDEX: 37.25 KG/M2

## 2023-05-19 DIAGNOSIS — I82.90 DEEP VEIN THROMBOSIS (DVT) ASSOCIATED WITH COVID-19: ICD-10-CM

## 2023-05-19 DIAGNOSIS — I26.99 ACUTE PULMONARY EMBOLISM, UNSPECIFIED PULMONARY EMBOLISM TYPE, UNSPECIFIED WHETHER ACUTE COR PULMONALE PRESENT: ICD-10-CM

## 2023-05-19 DIAGNOSIS — Z85.42 HISTORY OF ENDOMETRIAL CANCER: Primary | ICD-10-CM

## 2023-05-19 DIAGNOSIS — I26.94 MULTIPLE SUBSEGMENTAL PULMONARY EMBOLI WITHOUT ACUTE COR PULMONALE: ICD-10-CM

## 2023-05-19 DIAGNOSIS — C54.1 ENDOMETRIAL CANCER: ICD-10-CM

## 2023-05-19 DIAGNOSIS — U07.1 DEEP VEIN THROMBOSIS (DVT) ASSOCIATED WITH COVID-19: ICD-10-CM

## 2023-05-19 RX ORDER — TRIAMCINOLONE ACETONIDE 1 MG/G
CREAM TOPICAL
COMMUNITY
Start: 2023-03-06

## 2023-05-19 RX ORDER — CARBAMAZEPINE 200 MG/1
200 TABLET ORAL
COMMUNITY

## 2023-05-19 RX ORDER — CYCLOSPORINE 0.5 MG/ML
1 EMULSION OPHTHALMIC
COMMUNITY

## 2023-05-19 RX ORDER — FLUTICASONE PROPIONATE 50 MCG
1 SPRAY, SUSPENSION (ML) NASAL
COMMUNITY

## 2023-05-19 RX ORDER — ATORVASTATIN CALCIUM 40 MG/1
40 TABLET, FILM COATED ORAL DAILY
COMMUNITY

## 2023-05-19 RX ORDER — CITALOPRAM 20 MG/1
20 TABLET ORAL DAILY
COMMUNITY

## 2023-05-19 RX ORDER — METOPROLOL SUCCINATE 25 MG/1
TABLET, EXTENDED RELEASE ORAL
COMMUNITY
Start: 2023-05-16

## 2023-05-19 NOTE — PROGRESS NOTES
Bhavna Talbot  [id number]  1950      Reason for visit: History of endometrial cancer, now with newly diagnosed DVT/PE    History of present illness:  The patient is a 73 y.o. year old female who presents today for treatment and evaluation of the above issues.    Had filght to [place] 3 months ago.  Woke up and had cramp in LLE.  5/5/2023 she presented to [facility] after working all day long and developing progressive shortness of breath.  She underwent CT scan of chest which showed pulmonary emboli.  Subsequent noncontrast CT scan of abdomen and pelvis did not show anything suggestive of cancer recurrence.  She presents today for hospital discharge follow-up.  Shortness of breath is improved and patient is not on oxygen at the office today.  She continues to therapeutic anticoagulation.  She notes that it was recommended that she follow-up with oncology.  Possible contributing factors to her DVT include recent prolonged trip and she states she never felt right after she came back from [place], obesity, possibly prior COVID infection, and history of cancer.  She notes that she is very concerned about her sister-in-law who is 80 years old and has been diagnosed with widespread metastatic cancer.    Oncologic History:  Oncology/Hematology History   Endometrial cancer   4/19/2019 Procedure    Hysteroscopy D&C by Dr. Gross due to PMB, thickened endometrium, and inability to obtain EMB in office. Pathology revealed well-differentiated endometrioid adenocarcinoma     5/17/2019 Surgery    RTLH/BSO. Final path showed 1.5 x 1.0 x 0.4 cm tumor, invasive into 2/15 mm myometrium. No LVSI and all other structures negative. Stage IA grade 1    MSI testing showed absent MLH1 and PMS2 in a portion of the tumor. Reflex hypermethylation testing positive = sporadic tumor     9/5/2019 Survivorship    Survivorship Care Plan completed and discussed with patient.  Copy of Survivorship Care Plan provided to patient and  primary care provider.            Past Medical History:   Diagnosis Date   • Arthritis    • Asthma    • Atrial fibrillation and flutter     patient states this only occurred once, felt to be precipiated by a medication   • Bipolar 1 disorder 1988   • Diabetes type 2, controlled    • Diverticulitis 2016   • Endometrial cancer 5/9/2019   • Hearing loss 1970    right ear    • Heart murmur    • High blood pressure    • Hyperlipidemia    • Macular degeneration    • Skin cancer    • Sleep apnea 1992    does not use CPAP    • Wears glasses        Past Surgical History:   Procedure Laterality Date   • ADENOIDECTOMY     • COLONOSCOPY  2017   • COLONOSCOPY  02/2022   • EYE SURGERY Bilateral 2019   • INNER EAR SURGERY  1970   • REPLACEMENT TOTAL KNEE Left 2018   • REPLACEMENT TOTAL KNEE Right 2012   • SINUS SURGERY  1970   • SKIN BIOPSY     • SKIN BIOPSY  12/2021    Multiple- Benign   • TONSILLECTOMY     • TOTAL LAPAROSCOPIC HYSTERECTOMY, LAPAROSCOPIC NODE DISSECTION Bilateral 05/17/2019    Procedure: TOTAL LAPAROSCOPIC HYSTERECTOMY, BILATERAL SALPINGO OPHORECTOMY WITH DAVINCI ROBOT;  Surgeon: Shelly Rivera MD;  Location: [facility];  Service: DaVinci   • UVULOPLASTY  2000       MEDICATIONS: The current medication list was reviewed with the patient and updated in the EMR this date per the Medical Assistant. Medication dosages and frequencies were confirmed to be accurate.      Allergies:  is allergic to keflex [cephalexin], latex, and penicillins.    Social History:   Social History     Socioeconomic History   • Marital status:    Tobacco Use   • Smoking status: Never   • Smokeless tobacco: Never   Substance and Sexual Activity   • Alcohol use: Yes     Comment: occasionally - 1-2 per month    • Drug use: No   • Sexual activity: Never     Partners: Male       Family History:    Family History   Problem Relation Age of Onset   • Alzheimer's disease Mother    • Esophageal cancer Father    • Diabetes Maternal  "Grandmother    • Colon cancer Paternal Grandfather    • Diabetes Paternal Uncle    • Heart attack Brother        Health Maintenance:    Health Maintenance   Topic Date Due   • MAMMOGRAM  Never done   • URINE MICROALBUMIN  Never done   • DXA SCAN  Never done   • COLORECTAL CANCER SCREENING  Never done   • Pneumococcal Vaccine 65+ (1 - PCV) Never done   • TDAP/TD VACCINES (1 - Tdap) Never done   • ZOSTER VACCINE (2 of 3) 12/11/2014   • HEPATITIS C SCREENING  Never done   • ANNUAL WELLNESS VISIT  Never done   • DIABETIC FOOT EXAM  Never done   • DIABETIC EYE EXAM  Never done   • LIPID PANEL  Never done   • HEMOGLOBIN A1C  11/16/2019   • COVID-19 Vaccine (3 - Booster for Pfizer series) 08/27/2021   • INFLUENZA VACCINE  08/01/2023       Review of Systems  Please refer to history of present illness.  Review of systems otherwise negative.  Physical Exam    Vitals:    05/19/23 1549   BP: 122/58   Pulse: 66   Resp: 20   Temp: 97.2 °F (36.2 °C)   TempSrc: Temporal   SpO2: 94%   Weight: 99 kg (218 lb 3.2 oz)   Height: 162.6 cm (64\")   PainSc: 0-No pain       Body mass index is 37.45 kg/m².  Wt Readings from Last 3 Encounters:   05/19/23 99 kg (218 lb 3.2 oz)   03/16/23 96.1 kg (211 lb 14.4 oz)   03/16/22 108 kg (239 lb)       GENERAL: Alert, well-appearing female appearing her stated age who is in no apparent distress.   HEENT: Sclera anicteric. Head normocephalic, atraumatic. Mucus membranes moist.   GASTROINTESTINAL:  Abdomen is obese.  SKIN:  Warm, dry, well-perfused.  All visible areas intact.  No rashes, lesions, ulcers.  PSYCHIATRIC: AO x3, with appropriate affect, normal thought processes.  NEUROLOGIC: No focal deficits.  Moves extremities well.  MUSCULOSKELETAL: Normal gait and station.   EXTREMITIES:   No cyanosis, clubbing, symmetric.  LYMPHATICS:  No cervical or inguinal adenopathy noted.     PELVIC exam:    Deferred    ECOG PS 1    PROCEDURES: None    Diagnostic Data:    CT Abdomen Pelvis Without Contrast    Result " "Date: 5/7/2023  No acute disease. No evidence of metastatic disease. Images reviewed, interpreted and dictated by Dr. Walker Plascencia MD    CT Angiogram Chest    Result Date: 5/5/2023  Groundglass opacities as above. Bilateral pulmonary emboli, particularly on the right with mild right heart strain. The patient's nurse, Anila, was notified at  5/5/2023 3:30 PM Images reviewed, interpreted, and dictated by Dr. EDISON Murdock. Transcribed by LEAH Victoria(LONI).    XR chest AP portable    Result Date: 5/9/2023  Mild improved pulmonary vascular congestion. There is no edema or infiltrate. There is improved aeration of the lungs. Images reviewed, interpreted, dictated and electronically signed by Mil Francois MD Voice transcription technology ([email or web address]) is used for the dictation of this note and \"sound-alike\" words might be erroneously placed despite reviewing this note for accuracy. Errors in dictation may reflect use of voice recognition software and not all errors in transcription may have been detected prior to signing.    XR chest AP portable    Result Date: 5/4/2023  No acute cardiopulmonary findings. Images personally reviewed, interpreted and dictated by SHANNON Estevez M.D.      Lab Results   Component Value Date    WBC 4.37 05/16/2019    HGB 11.9 (L) 05/16/2019    HCT 38.3 05/16/2019    MCV 95.5 05/16/2019     05/16/2019    NEUTROABS 2.75 05/16/2019    GLUCOSE 94 05/16/2019    BUN 19 05/16/2019    CREATININE 0.86 05/16/2019    EGFRIFNONA 66 05/16/2019     05/16/2019    K 4.7 05/16/2019     05/16/2019    CO2 28.0 05/16/2019    MG 2.4 05/09/2023    CALCIUM 8.7 05/16/2019    ALBUMIN 3.80 05/16/2019    AST 18 05/16/2019    ALT 16 05/16/2019    BILITOT 0.2 05/16/2019     No results found for:       Assessment & Plan   This is a 73 y.o. woman with the above history of endometrial cancer now with DVT/PEs  Encounter Diagnoses   Name Primary?   • History of endometrial cancer Yes   • " Deep vein thrombosis (DVT) associated with COVID-19    • Acute pulmonary embolism, unspecified pulmonary embolism type, unspecified whether acute cor pulmonale present    • Endometrial cancer    • Multiple subsegmental pulmonary emboli without acute cor pulmonale        Patient is on therapeutic anticoagulation.  Imaging at this point does not show any evidence of recurrence although CT scan of abdomen and pelvis was performed without contrast.  CT scan of the abdomen and pelvis with contrast was ordered for 3 months time.  Patient is to follow-up as scheduled and will receive a phone call about her CT scan results.    Pain assessment was performed today as a part of patient’s care.  For patients with pain related to surgery, gynecologic malignancy or cancer treatment, the plan is as noted in the assessment/plan.  For patients with pain not related to these issues, they are to seek any further needed care from a more appropriate provider, such as PCP.      Orders Placed This Encounter   Procedures   • CT Abdomen Pelvis With Contrast     Standing Status:   Future     Standing Expiration Date:   5/18/2024     Order Specific Question:   Will Oral Contrast be needed for this procedure?     Answer:   Yes       FOLLOW UP: As scheduled, CT scan as above    I spent 20 minutes caring for Bhavna on this date of service. This time includes time spent by me in the following activities: preparing for the visit, reviewing tests, performing a medically appropriate examination and/or evaluation, counseling and educating the patient/family/caregiver, ordering medications, tests, or procedures, referring and communicating with other health care professionals and documenting information in the medical record    Electronically Signed by: Shelly Rivera MD  Date: 5/21/2023

## 2023-05-21 PROBLEM — I26.94 MULTIPLE SUBSEGMENTAL PULMONARY EMBOLI WITHOUT ACUTE COR PULMONALE: Status: ACTIVE | Noted: 2023-05-21

## 2024-04-15 ENCOUNTER — OFFICE VISIT (OUTPATIENT)
Dept: GYNECOLOGIC ONCOLOGY | Facility: CLINIC | Age: 74
End: 2024-04-15
Payer: MEDICARE

## 2024-04-15 VITALS
DIASTOLIC BLOOD PRESSURE: 62 MMHG | SYSTOLIC BLOOD PRESSURE: 112 MMHG | HEART RATE: 60 BPM | RESPIRATION RATE: 17 BRPM | TEMPERATURE: 97.1 F | WEIGHT: 218.4 LBS | BODY MASS INDEX: 37.28 KG/M2 | OXYGEN SATURATION: 98 % | HEIGHT: 64 IN

## 2024-04-15 DIAGNOSIS — Z85.42 HISTORY OF ENDOMETRIAL CANCER: Primary | ICD-10-CM

## 2024-04-15 PROCEDURE — 99214 OFFICE O/P EST MOD 30 MIN: CPT | Performed by: NURSE PRACTITIONER

## 2024-04-15 PROCEDURE — 3078F DIAST BP <80 MM HG: CPT | Performed by: NURSE PRACTITIONER

## 2024-04-15 PROCEDURE — 1159F MED LIST DOCD IN RCRD: CPT | Performed by: NURSE PRACTITIONER

## 2024-04-15 PROCEDURE — 3074F SYST BP LT 130 MM HG: CPT | Performed by: NURSE PRACTITIONER

## 2024-04-15 PROCEDURE — 1160F RVW MEDS BY RX/DR IN RCRD: CPT | Performed by: NURSE PRACTITIONER

## 2024-04-15 PROCEDURE — 1126F AMNT PAIN NOTED NONE PRSNT: CPT | Performed by: NURSE PRACTITIONER

## 2024-04-15 NOTE — PROGRESS NOTES
GYN ONCOLOGY CANCER SURVEILLANCE FOLLOW-UP    Bhavna Talbot  [id number]  1950    Subjective   Chief Complaint: h/o endometrial cancer        History of present illness:    Bhavna Talbot is a 73 y.o. year old female who is here today for ongoing surveillance of Endometrial Cancer, see Cancer History. Next month she will be 5 years out from completion of treatment with surgery alone (5/2019). Today, she is doing well overall, but does endorse generalized soreness especially in her face due to recent fall.  Reports accidentally tripping over a box in her garage and falling onto her face, arms, and legs.  She continues Eliquis for anticoagulation which was started following a PE in 5/2023. Subsequent noncontrast CT scan of abdomen and pelvis at that time did not show any evidence of cancer recurrence.  Immediately after her fall, 911 was called and she was evaluated in the emergency room and reports negative imaging of her head, neck, and LUE, and RLE. She denies any gyn complaints including no vaginal bleeding, pelvic pain, and changes in bowel or bladder function. All well woman screenings are UTD, followed by her PCP. States she recently saw Dr Nguyen for her AMW visit. Bhavna graduated from the [employer] weight loss program through [facility] physical therapy. She has maintained her weight loss over the past year and is motivated to continue weight loss journey.      Cancer History:   Oncology/Hematology History   Endometrial cancer   4/19/2019 Procedure    Hysteroscopy D&C by Dr. Gross due to PMB, thickened endometrium, and inability to obtain EMB in office. Pathology revealed well-differentiated endometrioid adenocarcinoma     5/17/2019 Surgery    RTLH/BSO. Final path showed 1.5 x 1.0 x 0.4 cm tumor, invasive into 2/15 mm myometrium. No LVSI and all other structures negative. Stage IA grade 1    MSI testing showed absent MLH1 and PMS2 in a portion of the tumor. Reflex hypermethylation testing positive = sporadic  "tumor     9/5/2019 Survivorship    Survivorship Care Plan completed and discussed with patient.  Copy of Survivorship Care Plan provided to patient and primary care provider.            The current medication list and allergy list were reviewed and reconciled.     Past Medical History, Past Surgical History, Social History, Family History have been reviewed and are without significant changes except as mentioned.      Review of Systems   Constitutional: Negative.    Gastrointestinal: Negative.    Genitourinary: Negative.    Musculoskeletal:  Positive for myalgias.   Skin:  Positive for bruise.   Psychiatric/Behavioral: Negative.             Objective   Physical Exam  Vital Signs: /62   Pulse 60   Temp 97.1 °F (36.2 °C) (Temporal)   Resp 17   Ht 162.6 cm (64.02\")   Wt 99.1 kg (218 lb 6.4 oz)   SpO2 98%   BMI 37.47 kg/m²   Vitals:    04/15/24 0943   PainSc: 0-No pain           General Appearance:  alert, cooperative, no apparent distress, appears stated age, and obese by BMI criteria   Neurologic/Psych: A&O x 3, gait steady, appropriate affect   HEENT:  Normocephalic, without obvious abnormality, mucous membranes moist. Bruising noted on cheeks and under eyes bilaterally with mild swelling.    Abdomen:   Soft, non-tender, non-distended, no organomegaly, and Exam limited d/t habitus.   Lymph nodes: No cervical, supraclavicular, inguinal adenopathy noted   Pelvic: External Genitalia  without lesions or skin changes  Vagina  is pale, atrophic.   Vaginal Cuff  Female Vaginal Cuff: smooth, intact, and without visible lesions  Uterus  surgically absent, no palpable masses, and limited d/t habitus  Ovaries  surgically absent bilaterally  Parametria  smooth  Rectovaginal  Female rectovaginal: confirms no masses or bleeding     ECOG score: 1             Result Review :  Last imaging study was CT ABDOMEN PELVIS W CONTRAST     Date of Exam: 6/28/2023 3:49 PM EDT     Indication: Neoplasm: pelvic, other primary, " recurrence, suspected/known.     Comparison: None available.     Technique: Axial CT images were obtained of the abdomen and pelvis following the uneventful intravenous administration of 90 mL Isovue 300. Reconstructed coronal and sagittal images were also obtained. Automated exposure control and iterative   construction methods were used.        Findings:  Lung Bases:    There are patchy bibasilar groundglass opacities with air trapping        Liver: Few hypodense nonenhancing liver lesions compatible with cysts        Biliary/Gallbladder: The gallbladder is without evidence of radiopaque stones. The biliary tree is nondilated.        Spleen:Spleen is normal in size and CT density.     Pancreas:   Pancreas shows homogeneous density. There is no evidence of pancreatic mass or peripancreatic fluid.        Kidneys: Kidneys are normal in size. There are no stones or hydronephrosis.        Adrenals: Adrenal glands are unremarkable.        Retroperitoneal/Lymph Nodes/Vasculature: No retroperitoneal adenopathy is identified by size criteria.        Gastrointestinal/Mesentery: The bowel loops are non-dilated without definite wall thickening or mass. The appendix appears within normal limits. No evidence of obstruction. No free air. There is sigmoid and left colon diverticulosis with no acute   diverticulitis        Bladder: The bladder is unremarkable     There are hysterectomy changes. The vaginal cuff is preserved with no evidence of recurrent or residual disease. There is no free fluid or free air.      Bony Structures: No definite destructive bone lesions. Grade 1 spondylolisthesis L4 on L5. Multilevel degenerative disease with bulges and lumbar spondylosis           IMPRESSION:  Impression:     1. Patchy bibasilar groundglass airspace opacities with air trapping incomplete evaluated     2. Hysterectomy changes. No CT evidence of recurrent or residual disease in the abdomen or pelvis     3. Few small hypodense  "nonenhancing liver lesions compatible with cysts     4. Colon diverticulosis.   Tumor marker:  No results found for: \"\"    PHQ-9 Total Score: 0    Procedure Note:            Assessment and Plan:       Diagnoses and all orders for this visit:    1. History of endometrial cancer (Primary)  There is no evidence of disease upon today's exam. Next month she will be at 5 year ernestina since completion of treatment with surgery alone and is doing very well. Advised that she may now follow with general GYN moving forward. She is understanding to call with any changes in pelvic symptoms or general GYN concerns at any time between regularly scheduled visits.  Offered to refer to GYN provider at [facility].  She previously followed with someone at [facility] but cannot recall the name.  Bhavna is considering scheduling with a gynecologist through [facility] as all her other providers and specialists are there.  States she has an upcoming appointment with Dr. Nguyen and would like to discuss her recommendations for general gynecologist at [facility].  I encouraged her to call me if she needs any assistance scheduling appointment to establish care or if she would prefer to see someone at [facility] and I am happy to help any way I can.     Pain assessment was performed today as a part of patient’s care.  For patients with pain related to surgery, gynecologic malignancy or cancer treatment, the plan is as noted in the assessment/plan.  For patients with pain not related to these issues, they are to seek any further needed care from a more appropriate provider, such as PCP.       Level of service justified based on 30 minutes spent in patient care on this date of service including, but not limited to: preparing to see the patient, obtaining and/or reviewing history, performing medically appropriate examination, ordering tests/medicine/procedures, independently interpreting results, documenting clinical " information in EHR, and counseling/education of patient/family/caregiver.       Follow-up:    Return to clinic in [place].      Electronically signed by SHAHRZAD Carl on 04/15/2024

## (undated) DEVICE — SAFESECURE,SECUREMENT,FOLEY CATH,STERILE: Brand: MEDLINE

## (undated) DEVICE — MANIP UTER RUMI 2 KOH EFFICIENT SS CP 2.5CM

## (undated) DEVICE — TIP COVER ACCESSORY

## (undated) DEVICE — GLV SURG DERMASSURE GRN LF PF SZ 6.5

## (undated) DEVICE — SUCTION CANISTER, 2500CC, RIGID: Brand: DEROYAL

## (undated) DEVICE — PK MAJ GYN DAVINCI 10

## (undated) DEVICE — OBT BLADLES ENDOWRIST DAVINCI/S 8MM

## (undated) DEVICE — SUT MNCRYL PLS ANTIB UD 3/0 PS2 27IN

## (undated) DEVICE — GLV SURG SENSICARE MICRO PF LF 6 STRL

## (undated) DEVICE — Device

## (undated) DEVICE — COVER,LIGHT HANDLE,FLX,1/PK: Brand: MEDLINE INDUSTRIES, INC.

## (undated) DEVICE — DRP ADAPT ALLY UTER POSTN SYS 1P/U

## (undated) DEVICE — FLTR PLUMEPORT LAP W/CONN STRL

## (undated) DEVICE — APPL CHLORAPREP W/TINT 26ML BLU

## (undated) DEVICE — SHEET, DRAPE, SPLIT, STERILE: Brand: MEDLINE

## (undated) DEVICE — MANIP UTER RUMI TP 5.1MM 6CM LAV

## (undated) DEVICE — HLDR CATH FOL STATLOCK SWVL TRICOT

## (undated) DEVICE — BOWL UTIL STRL 32OZ

## (undated) DEVICE — ANTIBACTERIAL UNDYED BRAIDED (POLYGLACTIN 910), SYNTHETIC ABSORBABLE SUTURE: Brand: COATED VICRYL

## (undated) DEVICE — ENDOPATH XCEL BLADELESS TROCARS WITH STABILITY SLEEVES: Brand: ENDOPATH XCEL

## (undated) DEVICE — SKIN AFFIX SURG ADHESIVE 72/CS 0.55ML: Brand: MEDLINE

## (undated) DEVICE — BNDR ABD PREMIUM/UNIV 10IN 27TO48IN

## (undated) DEVICE — INTENDED FOR TISSUE SEPARATION, AND OTHER PROCEDURES THAT REQUIRE A SHARP SURGICAL BLADE TO PUNCTURE OR CUT.: Brand: BARD-PARKER ® STAINLESS STEEL BLADES

## (undated) DEVICE — [HIGH FLOW INSUFFLATOR,  DO NOT USE IF PACKAGE IS DAMAGED,  KEEP DRY,  KEEP AWAY FROM SUNLIGHT,  PROTECT FROM HEAT AND RADIOACTIVE SOURCES.]: Brand: PNEUMOSURE

## (undated) DEVICE — ANTIBACTERIAL UNDYED BRAIDED (POLYGLACTIN 910), SYNTHETIC ABSORBABLE SURGICAL SUTURE: Brand: COATED VICRYL

## (undated) DEVICE — IRRIGATOR BULB ASEPTO 60CC STRL

## (undated) DEVICE — PAD ARMBRD SURG CONVOL 7.5X20X2IN